# Patient Record
Sex: MALE | Race: WHITE | HISPANIC OR LATINO | Employment: FULL TIME | URBAN - METROPOLITAN AREA
[De-identification: names, ages, dates, MRNs, and addresses within clinical notes are randomized per-mention and may not be internally consistent; named-entity substitution may affect disease eponyms.]

---

## 2017-01-24 ENCOUNTER — TELEPHONE (OUTPATIENT)
Dept: OPTOMETRY | Facility: CLINIC | Age: 52
End: 2017-01-24

## 2017-01-25 ENCOUNTER — OFFICE VISIT (OUTPATIENT)
Dept: OPTOMETRY | Facility: CLINIC | Age: 52
End: 2017-01-25
Payer: COMMERCIAL

## 2017-01-25 DIAGNOSIS — H52.02 HYPERMETROPIA, LEFT EYE: ICD-10-CM

## 2017-01-25 DIAGNOSIS — H52.221 REGULAR ASTIGMATISM, RIGHT EYE: ICD-10-CM

## 2017-01-25 DIAGNOSIS — H04.123 BILATERAL DRY EYES: Primary | ICD-10-CM

## 2017-01-25 DIAGNOSIS — H52.31 ANISOMETROPIA: ICD-10-CM

## 2017-01-25 DIAGNOSIS — H11.001 PTERYGIUM, RIGHT: ICD-10-CM

## 2017-01-25 DIAGNOSIS — R51.9 FREQUENT HEADACHES: ICD-10-CM

## 2017-01-25 PROCEDURE — 92015 DETERMINE REFRACTIVE STATE: CPT | Mod: S$GLB,,, | Performed by: OPTOMETRIST

## 2017-01-25 PROCEDURE — 92004 COMPRE OPH EXAM NEW PT 1/>: CPT | Mod: S$GLB,,, | Performed by: OPTOMETRIST

## 2017-01-25 PROCEDURE — 99999 PR PBB SHADOW E&M-EST. PATIENT-LVL II: CPT | Mod: PBBFAC,,, | Performed by: OPTOMETRIST

## 2017-01-25 NOTE — PATIENT INSTRUCTIONS
Lágrimas Artificiales, Gotas Oftálmicas  ¿Qué es ryann medicamento?  Las LÁGRIMAS ARTIFICIALES alivian la irritación y la molestia ocasionadas por la sequedad de los ojos.  Ryann medicamento puede ser utilizado para otros usos; si tiene alguna pregunta consulte con ramos proveedor de atención médica o con ramos farmacéutico.  ¿Qué le tiffany informar a mi profesional de la jeanna antes de miguel ángel ryann medicamento?  Necesita saber si usted presenta alguno de los siguientes problemas o situaciones:  · cambios en la visión  · infección o traumatismo ocular  · si usa lentes de contacto  · solange reacción alérgica o inusual a las lágrimas artificiales, a otros medicamentos, alimentos, colorantes o conservantes  · si está embarazada o buscando quedar embarazada  · si está amamantando a un bebé  ¿Cómo tiffany utilizar ryann medicamento?  Ryann medicamento es para utilizar solamente en los ojos. No lo tome por vía oral. Siga las instrucciones de la etiqueta del medicamento. Lávese las silvio antes y después de usarlo. Incline la cherelle ligeramente hacia atrás y lleve el párpado inferior hacia abajo con ramos dedo índice para formar solange bolsa. Trate de que la punta del gotero no toque el dennis, las yemas de los dedos o cualquier otra superficie. Aplique la cantidad de gotas recetada (generalmente de solange a dos gotas) dentro de la bolsa. Cierre los ojos suavemente orlin unos instantes para permitir que las gotas entren en contacto con el dennis. Utilice jolly dosis a intervalos regulares. No utilice el medicamento con solange frecuencia mayor a la indicada.  Hable con ramos pediatra para informarse acerca del uso de [facility]. Aunque ryann medicamento ha sido recetado a niños tan menores bahman de 6 años de edad para condiciones selectivas, las precauciones se aplican.  Sobredosis: Póngase en contacto inmediatamente con un centro toxicológico o solange rubén de urgencia si usted marian que haya tomado demasiado medicamento.  ATENCIÓN: Ryann medicamento es  solo para usted. No comparta ryann medicamento con nadie.  ¿Qué sucede si me olvido de solange dosis?  Si olvida solange dosis, aplíquela lo antes posible. Si es reggie la hora de la próxima dosis, aplique sólo charlie dosis. No use dosis adicionales o dobles.  ¿Qué puede interactuar con ryann medicamento?  No se esperan interacciones. Si está utilizando otras gotas oculares junto con ryann medicamento, separe las aplicaciones de cada gota ocular por intervalos de aproximadamente 5 minutos. De esta manera se asegura de que ninguna de las gotas interfiera con la otra. Si está utilizando gotas oculares y solange pomada ocular, utilice las gotas 10 minutos antes de aplicarse la pomada a fin de que esta última no interfiera con la acción de las primeras.  Puede ser que esta lista no menciona todas las posibles interacciones. Informe a ramos profesional de la jeanna de todos los productos a base de hierbas, medicamentos de venta foreign o suplementos nutritivos que esté tomando. Si usted fuma, consume bebidas alcohólicas o si utiliza drogas ilegales, indíqueselo también a ramos profesional de la jeanna. Algunas sustancias pueden interactuar con ramos medicamento.  ¿A qué tiffany estar atento al usar ryann medicamento?  Si experimenta dolor ocular, cambios en la visión, enrojecimiento o irritación continua de los ojos o si ramos problema ocular empeora o persiste orlin más de 72 horas, suspenda el uso y comuníquese con ramos profesional de la jeanna.  Para evitar la contaminación del producto, evite que la punta del envase entre en contacto con cualquier superficie. No comparta ryann medicamento con nadie. Si ryann producto cambia de color, no lo utilice.  Si usa lentes de contacto, debe quitárselos antes de aplicar las gotas en jolly ojos. Espere por lo menos 15 minutos después de aplicar las gotas antes de volver a colocarse jolly lentes de contacto.  ¿Qué efectos secundarios puedo tener al utilizar ryann medicamento?  Efectos secundarios que debe informar a ramos médico  o a ramos profesional de la jeanna tan pronto bahman sea posible:  · reacciones alérgicas bahman erupción cutánea, picazón o urticarias, hinchazón de la yoav, labios o lengua  · cambios en la visión  · irritación o enrojecimiento de los ojos que empeora o que dura más de 72 horas  · dolor en los ojos  Efectos secundarios que, por lo general, no requieren atención médica (debe informarlos a ramos médico o a ramos profesional de la jeanna si persisten o si son molestos):  · visión borrosa o escozor temporal al aplicar las gotas oculares  Puede ser que esta lista no menciona todos los posibles efectos secundarios. Comuníquese a ramos médico por asesoramiento médico sobre los efectos secundarios. Usted puede informar los efectos secundarios a la FDA por teléfono al [phone].  ¿Dónde tiffany guardar mi medicina?  Manténgala fuera del alcance de los niños.  Guárdela a temperatura ambiente, entre 15 y 30 grados C (59 y 86 grados F). No la congele. Deseche todo el medicamento que no haya utilizado, después de la fecha de vencimiento. Solange vez abierto el producto, la mayoría de expertos recomiendan descartarlo despues de 30 días.  © 4070-2588 [employer]. [address] [place] [address], [place] [address]. Todos los derechos reservados. Esta información no pretende sustituir la atención médica profesional. Sólo ramos médico puede diagnosticar y tratar un problema de jeanna.        Tratamiento del dennis seco    Las lágrimas artificiales son el tratamiento más común para el dennis seco. Si no le alivian los síntomas, es posible que ramos oftalmólogo le coloque unos tapones o le walker solange operación para sellar el conducto de drenaje y aumentar la película de lágrimas.  Lágrimas artificiales  Las lágrimas artificiales, o colirios lubricantes, sustituyen las lágrimas lubricantes naturales. En ramos gran mayoría, pueden comprarse sin receta y usarse tan a menudo bahman sea necesario. Los colirios lubricantes no son los mismos que los que se usan para aliviar  el enrojecimiento o la picazón. Pregúntele al farmaceuta para asegurarse de comprar el colirio correcto.  Nota:  Algunos de los colirios lubricantes contienen conservantes que prolongan ramos duración. Si jolly ojos son sensibles a los colirios, o si necesita ponerse gotas con frecuencia, paloma vez le convenga comprar colirios lubricantes que no contienen conservantes. Además, ramos oftalmólogo podría recomendarle que se ponga solange pomada lubricante en los ojos antes de dormir.  Medicamentos  Ramos médico podría recetarle medicamentos tales bahman ciclosporina para tratar ramos afección en los ojos. Ésta puede ayudar a mejorar la capacidad de jolly ojos para producir lágrimas.  Tapones    El bloqueo de los puntos lagrimales con tapones puede ayudar a mantener la película de lágrimas en el dennis. Sofie bloqueo actúa bahman un tapón en un fregadero, permitiendo que se drene sólo solange pequeña cantidad de lágrimas del dennis. Paloma vez ramos oftalmólogo pruebe francisca con tapones provisionales que se disuelven en pocos días; si éstos sirven, podría ponerle tapones de larga duración. Para insertarle los tapones, le dormirán los ojos con un colirio a fin de que usted no sienta ningún dolor. Solange vez que tenga los tapones insertados, no debería sentir que los tiene puestos.  Cirugía  Si el dennis seco no se le ericka con lágrimas artificiales o tapones, ramos oftalmólogo podría hacerle solange cirugía ambulatoria mary para estrecharle o bloquearle las aperturas de los limon de drenaje. Si la causa del dennis seco es un problema de los párpados, ramos oftalmólogo le podría recomendar otros tipos de cirugía.  © 6126-1526 [email or web address]. [address] [place] [address], [place] [address]. Todos los derechos reservados. Esta información no pretende sustituir la atención médica profesional. Sólo ramos médico puede diagnosticar y tratar un problema de jeanna.

## 2017-01-25 NOTE — MR AVS SNAPSHOT
[facility]  [address] [facility] [place] [address]  Phone: [phone]                  Todd Grajedadila   2017 8:20 AM   Office Visit    Descripción:  Male : 1965   Personal Médico:  Tamanna Lyon, OD   Departamento:  [facility]           Razón de la josue     Eye Exam                Lista de tareas           Metas (5 Years of Data)     Ninguna      Follow-Up and Disposition     Return in about 1 year (around 2018).      [facility]     [facility] de Enfermeras - Asistencia   Enfermeras registradas de [facility] pueden ayudarle a reservar solange josue, proveer educación para la jeanna, asesoría clínica, y otros servicios de asesoramiento.   Llame para ryann servicio gratuito a [phone].             Medicamentos           Mensaje sobre Medicamentos     Verificar los cambios y / o adiciones a ramos régimen de medicación son los mismos que discutir con ramos médico. Si cualquiera de estos cambios o adiciones son incorrectos, por favor notifique a ramos proveedor de atención médica.             Verifique que la siguiente lista de medicamentos es solange representación exacta de los medicamentos que está tomando actualmente. Si no hay ningunos reportados, la lista puede estar en baker. Si no es correcta, por favor póngase en contacto con ramos proveedor de atención médica. Lleve esta lista con usted en jesús de emergencia.                Información de referencia clínica           Alergias     A partir del:  2017        No Known Allergies      Vacunas     Administradas en la fecha de la visita:  2017        None      Instrucciones    Lágrimas Artificiales, Gotas Oftálmicas  ¿Qué es ryann medicamento?  Las LÁGRIMAS ARTIFICIALES alivian la irritación y la molestia ocasionadas por la sequedad de los ojos.  Ryann medicamento puede ser utilizado para otros usos; si tiene alguna pregunta consulte con ramos proveedor de atención médica o con ramos  farmacéutico.  ¿Qué le tiffany informar a mi profesional de la jeanna antes de miguel ángel ryann medicamento?  Necesita saber si usted presenta alguno de los siguientes problemas o situaciones:  · cambios en la visión  · infección o traumatismo ocular  · si usa lentes de contacto  · solange reacción alérgica o inusual a las lágrimas artificiales, a otros medicamentos, alimentos, colorantes o conservantes  · si está embarazada o buscando quedar embarazada  · si está amamantando a un bebé  ¿Cómo tiffany utilizar ryann medicamento?  Ryann medicamento es para utilizar solamente en los ojos. No lo tome por vía oral. Siga las instrucciones de la etiqueta del medicamento. Lávese las silvio antes y después de usarlo. Incline la cherelle ligeramente hacia atrás y lleve el párpado inferior hacia abajo con ramos dedo índice para formar solange bolsa. Trate de que la punta del gotero no toque el dennis, las yemas de los dedos o cualquier otra superficie. Aplique la cantidad de gotas recetada (generalmente de solange a dos gotas) dentro de la bolsa. Cierre los ojos suavemente orlin unos instantes para permitir que las gotas entren en contacto con el dennis. Utilice jolly dosis a intervalos regulares. No utilice el medicamento con solange frecuencia mayor a la indicada.  Hable con ramos pediatra para informarse acerca del uso de ryann medicamento en niños. Aunque ryann medicamento ha sido recetado a niños tan menores bahman de 6 años de edad para condiciones selectivas, las precauciones se aplican.  Sobredosis: Póngase en contacto inmediatamente con un centro toxicológico o solange rubén de urgencia si usted marian que haya tomado demasiado medicamento.  ATENCIÓN: Ryann medicamento es solo para usted. No comparta ryann medicamento con nadie.  ¿Qué sucede si me olvido de solange dosis?  Si olvida solange dosis, aplíquela lo antes posible. Si es reggie la hora de la próxima dosis, aplique sólo charlie dosis. No use dosis adicionales o dobles.  ¿Qué puede interactuar con ryann medicamento?  No se esperan  interacciones. Si está utilizando otras gotas oculares junto con ryann medicamento, separe las aplicaciones de cada gota ocular por intervalos de aproximadamente 5 minutos. De esta manera se asegura de que ninguna de las gotas interfiera con la otra. Si está utilizando gotas oculares y solange pomada ocular, utilice las gotas 10 minutos antes de aplicarse la pomada a fin de que esta última no interfiera con la acción de las primeras.  Puede ser que esta lista no menciona todas las posibles interacciones. Informe a ramos profesional de la jeanna de todos los productos a base de hierbas, medicamentos de venta foreign o suplementos nutritivos que esté tomando. Si usted fuma, consume bebidas alcohólicas o si utiliza drogas ilegales, indíqueselo también a ramos profesional de la jeanna. Algunas sustancias pueden interactuar con ramos medicamento.  ¿A qué tiffany estar atento al usar ryann medicamento?  Si experimenta dolor ocular, cambios en la visión, enrojecimiento o irritación continua de los ojos o si ramos problema ocular empeora o persiste orlin más de 72 horas, suspenda el uso y comuníquese con ramos profesional de la jeanna.  Para evitar la contaminación del producto, evite que la punta del envase entre en contacto con cualquier superficie. No comparta ryann medicamento con nadie. Si ryann producto cambia de color, no lo utilice.  Si usa lentes de contacto, debe quitárselos antes de aplicar las gotas en jolly ojos. Espere por lo menos 15 minutos después de aplicar las gotas antes de volver a colocarse jolly lentes de contacto.  ¿Qué efectos secundarios puedo tener al utilizar ryann medicamento?  Efectos secundarios que debe informar a ramos médico o a ramos profesional de la jeanna tan pronto bahman sea posible:  · reacciones alérgicas bahman erupción cutánea, picazón o urticarias, hinchazón de la yoav, labios o lengua  · cambios en la visión  · irritación o enrojecimiento de los ojos que empeora o que dura más de 72 horas  · dolor en los ojos  Efectos  secundarios que, por lo general, no requieren atención médica (debe informarlos a ramos médico o a ramos profesional de la jeanna si persisten o si son molestos):  · visión borrosa o escozor temporal al aplicar las gotas oculares  Puede ser que esta lista no menciona todos los posibles efectos secundarios. Comuníquese a ramos médico por asesoramiento médico sobre los efectos secundarios. Usted puede informar los efectos secundarios a la FDA por teléfono al [phone].  ¿Dónde tiffany guardar mi medicina?  Manténgala fuera del alcance de los niños.  Guárdela a temperatura ambiente, entre 15 y 30 grados C (59 y 86 grados F). No la congele. Deseche todo el medicamento que no haya utilizado, después de la fecha de vencimiento. Solange vez abierto el producto, la mayoría de expertos recomiendan descartarlo despues de 30 días.  © 2716-4171 [employer]. [address] [place] [address]. Todos los derechos reservados. Esta información no pretende sustituir la atención médica profesional. Sólo ramos médico puede diagnosticar y tratar un problema de jeanna.        Tratamiento del dennis seco    Las lágrimas artificiales son el tratamiento más común para el dennis seco. Si no le alivian los síntomas, es posible que ramos oftalmólogo le coloque unos tapones o le walker solange operación para sellar el conducto de drenaje y aumentar la película de lágrimas.  Lágrimas artificiales  Las lágrimas artificiales, o colirios lubricantes, sustituyen las lágrimas lubricantes naturales. En ramos gran mayoría, pueden comprarse sin receta y usarse tan a menudo bahman sea necesario. Los colirios lubricantes no son los mismos que los que se usan para aliviar el enrojecimiento o la picazón. Pregúntele al farmaceuta para asegurarse de comprar el colirio correcto.  Nota:  Algunos de los colirios lubricantes contienen conservantes que prolongan ramos duración. Si jolly ojos son sensibles a los colirios, o si necesita ponerse gotas con frecuencia, paloma vez le  convenga comprar colirios lubricantes que no contienen conservantes. Además, kat oftalmólogo podría recomendarle que se ponga solange pomada lubricante en los ojos antes de dormir.  Medicamentos  Kat médico podría recetarle medicamentos tales bahman ciclosporina para tratar kat afección en los ojos. Ésta puede ayudar a mejorar la capacidad de jolly ojos para producir lágrimas.  Tapones    El bloqueo de los puntos lagrimales con tapones puede ayudar a mantener la película de lágrimas en el dennis. Sofie bloqueo actúa bahman un tapón en un fregadero, permitiendo que se drene sólo solange pequeña cantidad de lágrimas del dennis. Taz vez kat oftalmólogo pruebe francisca con tapones provisionales que se disuelven en pocos días; si éstos sirven, podría ponerle tapones de larga duración. Para insertarle los tapones, le dormirán los ojos con un colirio a fin de que usted no sienta ningún dolor. Solange vez que tenga los tapones insertados, no debería sentir que los tiene puestos.  Cirugía  Si el dennis seco no se le ericka con lágrimas artificiales o tapones, kat oftalmólogo podría hacerle solange cirugía ambulatoria mary para estrecharle o bloquearle las aperturas de los limon de drenaje. Si la causa del dennis seco es un problema de los párpados, kat oftalmólogo le podría recomendar otros tipos de cirugía.  © 0564-5589 [employer]. [address] [place] [address]. Todos los derechos reservados. Esta información no pretende sustituir la atención médica profesional. Sólo kat médico puede diagnosticar y tratar un problema de jeanna.                      Todd Sevilla   2017 8:20 AM   Office Visit    Description:  Male : 1965   Provider:  Tamanna Lyon OD   Department:  [facility]           Reason for Visit     Eye Exam                To Do List           Goals     None      Follow-Up and Disposition     Return in about 1 year (around 2018).      [facility]     [facility] -  24/7 Assistance  Registered nurses in the [facility] provide clinical advisement, health education, appointment booking, and other advisory services.  Call for this free service at [phone].             Medications           Message regarding Medications     Verify the changes and/or additions to your medication regime listed below are the same as discussed with your clinician today.  If any of these changes or additions are incorrect, please notify your healthcare provider.             Verify that the below list of medications is an accurate representation of the medications you are currently taking.  If none reported, the list may be blank. If incorrect, please contact your healthcare provider. Carry this list with you in case of emergency.                Clinical Reference Information           Allergies as of 1/25/2017     No Known Allergies      Immunizations Administered on Date of Encounter - 1/25/2017     None      Instructions    Lágrimas Artificiales, Gotas Oftálmicas  ¿Qué es ryann medicamento?  Las LÁGRIMAS ARTIFICIALES alivian la irritación y la molestia ocasionadas por la sequedad de los ojos.  Ryann medicamento puede ser utilizado para otros usos; si tiene alguna pregunta consulte con ramos proveedor de atención médica o con ramos farmacéutico.  ¿Qué le tiffany informar a mi profesional de la jeanna antes de miguel ángel ryann medicamento?  Necesita saber si usted presenta alguno de los siguientes problemas o situaciones:  · cambios en la visión  · infección o traumatismo ocular  · si usa lentes de contacto  · solange reacción alérgica o inusual a las lágrimas artificiales, a otros medicamentos, alimentos, colorantes o conservantes  · si está embarazada o buscando quedar embarazada  · si está amamantando a un bebé  ¿Cómo tiffany utilizar ryann medicamento?  Ryann medicamento es para utilizar solamente en los ojos. No lo tome por vía oral. Siga las instrucciones de la etiqueta del medicamento. Lávese las silvio  antes y después de usarlo. Incline la cherelle ligeramente hacia atrás y lleve el párpado inferior hacia abajo con ramos dedo índice para formar solange bolsa. Trate de que la punta del gotero no toque el dennis, las yemas de los dedos o cualquier otra superficie. Aplique la cantidad de gotas recetada (generalmente de solange a dos gotas) dentro de la bolsa. Cierre los ojos suavemente orlin unos instantes para permitir que las gotas entren en contacto con el dennis. Utilice jolly dosis a intervalos regulares. No utilice el medicamento con solange frecuencia mayor a la indicada.  Hable con ramos pediatra para informarse acerca del uso de ryann medicamento en niños. Aunque ryann medicamento ha sido recetado a niños tan menores bahman de 6 años de edad para condiciones selectivas, las precauciones se aplican.  Sobredosis: Póngase en contacto inmediatamente con un centro toxicológico o solange rubén de urgencia si usted marian que haya tomado demasiado medicamento.  ATENCIÓN: Ryann medicamento es solo para usted. No comparta ryann medicamento con nadie.  ¿Qué sucede si me olvido de solange dosis?  Si olvida solange dosis, aplíquela lo antes posible. Si es reggie la hora de la próxima dosis, aplique sólo charlie dosis. No use dosis adicionales o dobles.  ¿Qué puede interactuar con ryann medicamento?  No se esperan interacciones. Si está utilizando otras gotas oculares junto con ryann medicamento, separe las aplicaciones de cada gota ocular por intervalos de aproximadamente 5 minutos. De esta manera se asegura de que ninguna de las gotas interfiera con la otra. Si está utilizando gotas oculares y solange pomada ocular, utilice las gotas 10 minutos antes de aplicarse la pomada a fin de que esta última no interfiera con la acción de las primeras.  Puede ser que esta lista no menciona todas las posibles interacciones. Informe a ramos profesional de la jeanna de todos los productos a base de hierbas, medicamentos de venta foreign o suplementos nutritivos que esté tomando. Si usted fuma,  consume bebidas alcohólicas o si utiliza drogas ilegales, indíqueselo también a ramos profesional de la jeanna. Algunas sustancias pueden interactuar con ramos medicamento.  ¿A qué tiffany estar atento al usar ryann medicamento?  Si experimenta dolor ocular, cambios en la visión, enrojecimiento o irritación continua de los ojos o si ramos problema ocular empeora o persiste orlin más de 72 horas, suspenda el uso y comuníquese con ramos profesional de la jeanna.  Para evitar la contaminación del producto, evite que la punta del envase entre en contacto con cualquier superficie. No comparta ryann medicamento con nadie. Si ryann producto cambia de color, no lo utilice.  Si usa lentes de contacto, debe quitárselos antes de aplicar las gotas en jolly ojos. Espere por lo menos 15 minutos después de aplicar las gotas antes de volver a colocarse jolly lentes de contacto.  ¿Qué efectos secundarios puedo tener al utilizar ryann medicamento?  Efectos secundarios que debe informar a ramos médico o a ramos profesional de la jeanna tan pronto bahman sea posible:  · reacciones alérgicas bahman erupción cutánea, picazón o urticarias, hinchazón de la yoav, labios o lengua  · cambios en la visión  · irritación o enrojecimiento de los ojos que empeora o que dura más de 72 horas  · dolor en los ojos  Efectos secundarios que, por lo general, no requieren atención médica (debe informarlos a ramos médico o a ramos profesional de la jeanna si persisten o si son molestos):  · visión borrosa o escozor temporal al aplicar las gotas oculares  Puede ser que esta lista no menciona todos los posibles efectos secundarios. Comuníquese a ramos médico por asesoramiento médico sobre los efectos secundarios. Usted puede informar los efectos secundarios a la FDA por teléfono al [phone].  ¿Dónde tiffany guardar mi medicina?  Manténgala fuera del alcance de los niños.  Guárdela a temperatura ambiente, entre 15 y 30 grados C (59 y 86 grados F). No la congele. Deseche todo el medicamento que no  haya utilizado, después de la fecha de vencimiento. Solange vez abierto el producto, la mayoría de expertos recomiendan descartarlo despues de 30 días.  © 9044-6487 [employer]. [address] [place] [address]. Todos los derechos reservados. Esta información no pretende sustituir la atención médica profesional. Sólo ramos médico puede diagnosticar y tratar un problema de jeanna.        Tratamiento del dennis seco    Las lágrimas artificiales son el tratamiento más común para el dennis seco. Si no le alivian los síntomas, es posible que ramos oftalmólogo le coloque unos tapones o le walker solange operación para sellar el conducto de drenaje y aumentar la película de lágrimas.  Lágrimas artificiales  Las lágrimas artificiales, o colirios lubricantes, sustituyen las lágrimas lubricantes naturales. En ramos gran mayoría, pueden comprarse sin receta y usarse tan a menudo bahman sea necesario. Los colirios lubricantes no son los mismos que los que se usan para aliviar el enrojecimiento o la picazón. Pregúntele al farmaceuta para asegurarse de comprar el colirio correcto.  Nota:  Algunos de los colirios lubricantes contienen conservantes que prolongan ramos duración. Si jolly ojos son sensibles a los colirios, o si necesita ponerse gotas con frecuencia, paloma vez le convenga comprar colirios lubricantes que no contienen conservantes. Además, ramos oftalmólogo podría recomendarle que se ponga solange pomada lubricante en los ojos antes de dormir.  Medicamentos  Ramos médico podría recetarle medicamentos tales bahman ciclosporina para tratar ramos afección en los ojos. Ésta puede ayudar a mejorar la capacidad de jolly ojos para producir lágrimas.  Tapones    El bloqueo de los puntos lagrimales con tapones puede ayudar a mantener la película de lágrimas en el dennis. Sofie bloqueo actúa bahman un tapón en un fregadero, permitiendo que se drene sólo solange pequeña cantidad de lágrimas del dennis. Paloma vez ramos oftalmólogo pruebe francisca con tapones provisionales que  se disuelven en pocos días; si éstos sirven, podría ponerle tapones de larga duración. Para insertarle los tapones, le dormirán los ojos con un colirio a fin de que usted no sienta ningún dolor. Solange vez que tenga los tapones insertados, no debería sentir que los tiene puestos.  Cirugía  Si el dennis seco no se le ericka con lágrimas artificiales o tapones, ramos oftalmólogo podría hacerle solange cirugía ambulatoria mary para estrecharle o bloquearle las aperturas de los limon de drenaje. Si la causa del dennis seco es un problema de los párpados, ramos oftalmólogo le podría recomendar otros tipos de cirugía.  © 2594-8047 [employer]. [address] [place] [address], [place] [address]. Todos los derechos reservados. Esta información no pretende sustituir la atención médica profesional. Sólo ramos médico puede diagnosticar y tratar un problema de jeanna.

## 2018-03-06 NOTE — PROGRESS NOTES
HPI     Patient here today for comprehensive eye exam     He reports eyes burning throughout the day, eyes also feel tired at the   end of the day for the last 3 months.  He also has daily headaches for the past 3 months, usually in the mornings   (including upon waking), and sometimes in the evening. His PCP recommended   eye examination.  He reports satisfactory distance vision without glasses. Blurred near   vision without glasses, improved with +1.50 OTC readers.     (+)drops occasionally uses Visine may use a few times per month   (-)pain  (-)flashes  (+)floaters OU has had for years has not seen any recently   (-)diplopia    Diabetic no  LBS n/a  No results found for: HGBA1C    OCULAR HISTORY  Last Eye Exam 1st eye exam   (-)eye surgery   (-)eye injury   (-)diagnosed or treated for any eye conditions or diseases none    FAMILY HISTORY  (-)Glaucoma none   (-)Macular Degeneration none          Last edited by Tamanna Lyon, OD on 1/25/2017 10:29 AM.         Assessment /Plan     For exam results, see Encounter Report.    Bilateral dry eyes   Cause of burning and SPK OU. Recommended artificial tears BID-TID OU.    Frequent headaches   Possibly caused by uncorrected anisometropia, although pt sometimes awakes in the morning with headaches (suggests that visual etiology is unlikely). Recommended full-time glasses wear, if no improvement in headaches after 2 weeks then continue work-up with PCP.    Anisometropia  Regular astigmatism, right eye  Hypermetropia, left eye   New glasses prescription released, adaptation expected.   Eyeglass Final Rx     Eyeglass Final Rx      Sphere Cylinder Axis Add   Right [place] +1.00 180 +1.50   Left +1.25 Sphere  +1.50       Expiration Date:  1/26/2018    Comments:  Full-time wear             Pterygium, right   Asymptomatic. Recommended UV protection. Monitor.      RTC 1 yr                  Estimated Blood Loss (Cc): minimal

## 2020-02-26 ENCOUNTER — OFFICE VISIT (OUTPATIENT)
Dept: URGENT CARE | Facility: CLINIC | Age: 55
End: 2020-02-26
Payer: COMMERCIAL

## 2020-02-26 VITALS
HEART RATE: 90 BPM | SYSTOLIC BLOOD PRESSURE: 157 MMHG | TEMPERATURE: 98 F | RESPIRATION RATE: 18 BRPM | OXYGEN SATURATION: 98 % | DIASTOLIC BLOOD PRESSURE: 103 MMHG

## 2020-02-26 DIAGNOSIS — M79.671 RIGHT FOOT PAIN: Primary | ICD-10-CM

## 2020-02-26 PROCEDURE — 73630 X-RAY EXAM OF FOOT: CPT | Mod: RT,S$GLB,, | Performed by: RADIOLOGY

## 2020-02-26 PROCEDURE — 99203 PR OFFICE/OUTPT VISIT, NEW, LEVL III, 30-44 MIN: ICD-10-PCS | Mod: S$GLB,,, | Performed by: NURSE PRACTITIONER

## 2020-02-26 PROCEDURE — 73630 XR FOOT COMPLETE 3 VIEW RIGHT: ICD-10-PCS | Mod: RT,S$GLB,, | Performed by: RADIOLOGY

## 2020-02-26 PROCEDURE — 99203 OFFICE O/P NEW LOW 30 MIN: CPT | Mod: S$GLB,,, | Performed by: NURSE PRACTITIONER

## 2020-02-26 RX ORDER — NAPROXEN 500 MG/1
500 TABLET ORAL 2 TIMES DAILY WITH MEALS
Qty: 14 TABLET | Refills: 0 | Status: SHIPPED | OUTPATIENT
Start: 2020-02-26 | End: 2020-03-04

## 2020-02-26 NOTE — PROGRESS NOTES
Subjective:       Patient ID: Todd Sevilla is a 54 y.o. male.    Vitals:  temperature is 97.9 °F (36.6 °C). His blood pressure is 157/103 (abnormal) and his pulse is 90. His respiration is 18 and oxygen saturation is 98%.     Chief Complaint: Foot Injury    This is a 54 y.o. male who presents today with a chief complaint of right foot pain that began 2 days ago. The pain is worse in the morning. States he had tenderness, redness, and swelling in his great toe in that foot one month ago- took advil and it went away. No personal hx of gout but his dad had gout. No injury or trauma. He's been taking Advil to help relieve his symptoms.     Foot Injury    The incident occurred more than 1 week ago. There was no injury mechanism. The pain is present in the right foot. The pain is at a severity of 7/10. The pain is severe. The pain has been constant since onset. Associated symptoms include an inability to bear weight. He reports no foreign bodies present. The symptoms are aggravated by weight bearing. Treatments tried: advil. The treatment provided no relief.       Constitution: Negative for chills, fatigue and fever.   HENT: Negative for congestion and sore throat.    Neck: Negative for painful lymph nodes.   Cardiovascular: Negative for chest pain and leg swelling.   Eyes: Negative for double vision and blurred vision.   Respiratory: Negative for cough and shortness of breath.    Gastrointestinal: Negative for nausea, vomiting and diarrhea.   Genitourinary: Negative for dysuria, frequency and urgency.   Musculoskeletal: Positive for pain. Negative for joint pain, joint swelling, muscle cramps and muscle ache.   Skin: Negative for color change, pale and rash.   Allergic/Immunologic: Negative for seasonal allergies.   Neurological: Negative for dizziness, history of vertigo, light-headedness, passing out and headaches.   Hematologic/Lymphatic: Negative for swollen lymph nodes, easy bruising/bleeding and history of  blood clots. Does not bruise/bleed easily.   Psychiatric/Behavioral: Negative for nervous/anxious, sleep disturbance and depression. The patient is not nervous/anxious.        Objective:      Physical Exam   Constitutional: He is oriented to person, place, and time. Vital signs are normal. He appears well-developed and well-nourished. He is active and cooperative. No distress.   HENT:   Head: Normocephalic and atraumatic.   Nose: Nose normal.   Mouth/Throat: Oropharynx is clear and moist and mucous membranes are normal.   Eyes: Conjunctivae and lids are normal.   Neck: Trachea normal, normal range of motion, full passive range of motion without pain and phonation normal. Neck supple.   Cardiovascular: Normal rate, regular rhythm, normal heart sounds, intact distal pulses and normal pulses.   Pulmonary/Chest: Effort normal and breath sounds normal.   Abdominal: Soft. Normal appearance and bowel sounds are normal. He exhibits no abdominal bruit, no pulsatile midline mass and no mass.   Musculoskeletal: He exhibits no edema or deformity.        Right foot: There is tenderness. There is normal range of motion, no bony tenderness, no swelling, normal capillary refill, no crepitus, no deformity and no laceration.        Feet:    ROM normal. Mild TTP over medial aspect of foot. No redness, warmth, swelling.    Neurological: He is alert and oriented to person, place, and time. He has normal strength and normal reflexes. No sensory deficit.   Skin: Skin is warm, dry, intact and not diaphoretic. not right foot  Psychiatric: He has a normal mood and affect. His speech is normal and behavior is normal. Judgment and thought content normal. Cognition and memory are normal.   Nursing note and vitals reviewed.  X-ray Foot Complete 3 View Right    Result Date: 2/26/2020  EXAMINATION: XR FOOT COMPLETE 3 VIEW RIGHT CLINICAL HISTORY: . Pain in right foot TECHNIQUE: AP, lateral, and oblique views of the right foot were performed.  COMPARISON: None FINDINGS: Bones are well mineralized.  Alignment is satisfactory and joint spaces are fairly well maintained.  No fracture, dislocation, or osseous destruction.  Mild degenerative changes are noted.  No soft tissue swelling appreciated.     No acute abnormality Electronically signed by: Patrick Palmer MD Date:    02/26/2020 Time:    12:38        Assessment:       1. Right foot pain        Plan:         Right foot pain  -     X-Ray Foot Complete 3 view Right; Future; Expected date: 02/26/2020  -     naproxen (NAPROSYN) 500 MG tablet; Take 1 tablet (500 mg total) by mouth 2 (two) times daily with meals. for 7 days  Dispense: 14 tablet; Refill: 0  -     Ambulatory referral/consult to Internal Medicine         Reviewed previous pertinent office visits, PMH, PSH, fam hx  RICE therapy for now along with short course of naproxen  Pt needs PCP, referral placed  Advised on return/follow-up precautions. Advised on ER precautions. Answered all patient questions. Patient verbalized understanding and voiced agreement with current treatment plan.    Patient Instructions       R.I.C.E.  R.I.C.E. es el acrónimo de descansar, ponerse hielo, hacer presión y tener levantada la ezequiel lesionada, son medidas que le ayudan a tener menos dolor e hinchazón después de solange distensión, esguince, magulladura o golpe wendy. Si se ha lesionado, siga estas sugerencias para miguel ángel medidas lo antes posible.  [place]  El dolor es la forma que tiene el cuerpo de decir que el área lesionada debe descansar. Ya sea que se haya golpeado el codo, la mano, el pie o la rodilla, limite el uso de la parte lesionada para evitar un mayor daño y ayudarse a sanar.  Hielo  Colocarse hielo inmediatamente después de solange lesión ayuda a evitar la hinchazón y a calmar el dolor. No ponga el hielo directamente sobre la piel.  · Envuelva solange bolsa de hielo en solange nellie delgada y colóquela sobre el área lesionada.  · Póngase el hielo orlin 10 minutos cada  3 horas, avelino no más de 20 minutos cada vez.  Compresión  Hacer presión (compresión) sobre la lesión ayuda a evitar la hinchazón y sirve de soporte.  · Vende firmemente el área lesionada con solange venda elástica. Si siente cosquilleo en la mano o el pie, cambian de color o los siente fríos cuando los toca, puede ser que el vendaje esté muy ajustado. Almita un nuevo vendaje más flojo.   · Si el vendaje se afloja demasiado, vuelva a vendarse.  · No use solange venda elástica orlin toda la noche.  Elevación  La elevación es más eficaz cuando se mantiene la lesión elevada más alto que el nivel del corazón. Mantener solange lesión elevada ayuda a reducir la hinchazón, el dolor y la sensación palpitante.  Llame a ramos proveedor de atención médica si nota cualquiera de estos síntomas:  · Los dedos de la mano o del pie están adormecidos, pimentel cambiado de color o están fríos cuando los toca.  · La piel está brillante o estirada.  · Empeora el dolor, la hinchazón o el moretón, y no mejoran cuando la lesión se levanta.       Date Last Reviewed: 9/3/2015  © 7299-5843 [employer]. [address] [place] [id number]. Todos los derechos reservados. Esta información no pretende sustituir la atención médica profesional. Sólo ramos médico puede diagnosticar y tratar un problema de jeanna.

## 2020-02-26 NOTE — PATIENT INSTRUCTIONS
R.I.C.E.  R.I.C.E. es el acrónimo de descansar, ponerse hielo, hacer presión y tener levantada la ezequiel lesionada, son medidas que le ayudan a tener menos dolor e hinchazón después de solange distensión, esguince, magulladura o golpe wendy. Si se ha lesionado, siga estas sugerencias para miguel ángel medidas lo antes posible.  [place]  El dolor es la forma que tiene el cuerpo de decir que el área lesionada debe descansar. Ya sea que se haya golpeado el codo, la mano, el pie o la rodilla, limite el uso de la parte lesionada para evitar un mayor daño y ayudarse a sanar.  Hielo  Colocarse hielo inmediatamente después de solange lesión ayuda a evitar la hinchazón y a calmar el dolor. No ponga el hielo directamente sobre la piel.  · Envuelva solange bolsa de hielo en solange nellie delgada y colóquela sobre el área lesionada.  · Póngase el hielo orlin 10 minutos cada 3 horas, avelino no más de 20 minutos cada vez.  Compresión  Hacer presión (compresión) sobre la lesión ayuda a evitar la hinchazón y sirve de soporte.  · Vende firmemente el área lesionada con solange venda elástica. Si siente cosquilleo en la mano o el pie, cambian de color o los siente fríos cuando los toca, puede ser que el vendaje esté muy ajustado. Almita un nuevo vendaje más flojo.   · Si el vendaje se afloja demasiado, vuelva a vendarse.  · No use solange venda elástica orlin toda la noche.  Elevación  La elevación es más eficaz cuando se mantiene la lesión elevada más alto que el nivel del corazón. Mantener solange lesión elevada ayuda a reducir la hinchazón, el dolor y la sensación palpitante.  Llame a ramos proveedor de atención médica si nota cualquiera de estos síntomas:  · Los dedos de la mano o del pie están adormecidos, pimentel cambiado de color o están fríos cuando los toca.  · La piel está brillante o estirada.  · Empeora el dolor, la hinchazón o el moretón, y no mejoran cuando la lesión se levanta.       Date Last Reviewed: 9/3/2015  © 6601-0604 [employer] [address]  [facility], [place] [address]. Todos los derechos reservados. Esta información no pretende sustituir la atención médica profesional. Sólo ramos médico puede diagnosticar y tratar un problema de jeanna.

## 2020-03-04 ENCOUNTER — OFFICE VISIT (OUTPATIENT)
Dept: URGENT CARE | Facility: CLINIC | Age: 55
End: 2020-03-04
Payer: COMMERCIAL

## 2020-03-04 VITALS
HEART RATE: 97 BPM | TEMPERATURE: 101 F | HEIGHT: 67 IN | OXYGEN SATURATION: 95 % | DIASTOLIC BLOOD PRESSURE: 93 MMHG | BODY MASS INDEX: 29.82 KG/M2 | SYSTOLIC BLOOD PRESSURE: 142 MMHG | WEIGHT: 190 LBS | RESPIRATION RATE: 18 BRPM

## 2020-03-04 DIAGNOSIS — R50.9 FEVER, UNSPECIFIED FEVER CAUSE: ICD-10-CM

## 2020-03-04 DIAGNOSIS — J10.1 INFLUENZA A: Primary | ICD-10-CM

## 2020-03-04 LAB
CTP QC/QA: YES
FLUAV AG NPH QL: POSITIVE
FLUBV AG NPH QL: NEGATIVE

## 2020-03-04 PROCEDURE — 87804 INFLUENZA ASSAY W/OPTIC: CPT | Mod: QW,S$GLB,, | Performed by: NURSE PRACTITIONER

## 2020-03-04 PROCEDURE — 87804 POCT INFLUENZA A/B: ICD-10-PCS | Mod: 59,QW,S$GLB, | Performed by: NURSE PRACTITIONER

## 2020-03-04 PROCEDURE — 99214 OFFICE O/P EST MOD 30 MIN: CPT | Mod: 25,S$GLB,, | Performed by: NURSE PRACTITIONER

## 2020-03-04 PROCEDURE — 99214 PR OFFICE/OUTPT VISIT, EST, LEVL IV, 30-39 MIN: ICD-10-PCS | Mod: 25,S$GLB,, | Performed by: NURSE PRACTITIONER

## 2020-03-04 RX ORDER — OSELTAMIVIR PHOSPHATE 75 MG/1
75 CAPSULE ORAL 2 TIMES DAILY
Qty: 10 CAPSULE | Refills: 0 | Status: SHIPPED | OUTPATIENT
Start: 2020-03-04 | End: 2020-03-09

## 2020-03-04 NOTE — LETTER
March 4, 2020      [facility]  [address]  [facility] [address]  Phone: [phone]  Fax: [phone]       Patient: Todd Sevilla   YOB: 1965  Date of Visit: 03/04/2020    To Whom It May Concern:    Ariel Sevilla  was at [facility] on 03/04/2020. He may return to work on 03/09/20 or fever free without Tylenol or Ibuprofen with no restrictions. If you have any questions or concerns, or if I can be of further assistance, please do not hesitate to contact me.    Sincerely,        Opal Eid, RT

## 2020-03-05 NOTE — PROGRESS NOTES
"Subjective:       Patient ID: Todd Sevilla is a 54 y.o. male.    Vitals:  height is 5' 7" (1.702 m) and weight is 86.2 kg (190 lb). His tympanic temperature is 101.4 °F (38.6 °C) (abnormal). His blood pressure is 142/93 (abnormal) and his pulse is 97. His respiration is 18 and oxygen saturation is 95%.     Chief Complaint: URI    This is a 54 y.o. male who presents today with a chief complaint of persistent cough, headache and sore throat for 4 days. Taking Mucinex and Acetamminophen. Did not work Monday or Tuesday. Worked today. Fever began this afternoon. Vomited 3 times day was white mucous. Last Acetaminophen was noon today.     URI    This is a new problem. The current episode started in the past 7 days. The problem has been gradually worsening. Associated symptoms include congestion, coughing, headaches, nausea, a sore throat and vomiting. Pertinent negatives include no dysuria, ear pain, plugged ear sensation, rash, sinus pain or wheezing. He has tried acetaminophen for the symptoms. The treatment provided mild relief.       Constitution: Positive for activity change, fatigue, fever and generalized weakness. Negative for chills, sweating and international travel in last 60 days.   HENT: Positive for congestion and sore throat. Negative for ear pain, tinnitus, sinus pain, sinus pressure and voice change.    Neck: Negative for painful lymph nodes.   Cardiovascular: Positive for sob on exertion. Negative for chest trauma.   Eyes: Negative for eye redness.   Respiratory: Positive for cough. Negative for chest tightness, sputum production, bloody sputum, COPD, shortness of breath, stridor, wheezing and asthma.    Gastrointestinal: Positive for nausea and vomiting.   Genitourinary: Negative for dysuria.   Musculoskeletal: Negative for trauma and muscle ache.   Skin: Negative for rash.   Allergic/Immunologic: Negative for seasonal allergies, asthma, immunizations up-to-date and flu shot.   Neurological: " Positive for dizziness, light-headedness and headaches. Negative for altered mental status.   Hematologic/Lymphatic: Negative for swollen lymph nodes.   Psychiatric/Behavioral: Negative for altered mental status and confusion.       Objective:      Physical Exam   Constitutional: He is oriented to person, place, and time. He appears well-developed and well-nourished. He is cooperative.  Non-toxic appearance. He does not have a sickly appearance. He appears ill. No distress.   HENT:   Head: Normocephalic and atraumatic.   Right Ear: Hearing, tympanic membrane, external ear and ear canal normal.   Left Ear: Hearing, tympanic membrane, external ear and ear canal normal.   Nose: Mucosal edema and rhinorrhea present. No purulent discharge or nasal deformity. No epistaxis. Right sinus exhibits no maxillary sinus tenderness and no frontal sinus tenderness. Left sinus exhibits no maxillary sinus tenderness and no frontal sinus tenderness.   Mouth/Throat: Uvula is midline, oropharynx is clear and moist and mucous membranes are normal. No trismus in the jaw. Normal dentition. No uvula swelling. No oropharyngeal exudate, posterior oropharyngeal edema or posterior oropharyngeal erythema.   Eyes: Conjunctivae and lids are normal. No scleral icterus.   Neck: Trachea normal, full passive range of motion without pain and phonation normal. Neck supple. No neck rigidity. No edema and no erythema present.   Cardiovascular: Normal rate, regular rhythm, normal heart sounds, intact distal pulses and normal pulses.   Pulmonary/Chest: Effort normal and breath sounds normal. No respiratory distress. He has no decreased breath sounds. He has no wheezes. He has no rhonchi. He has no rales.   Abdominal: Normal appearance.   Musculoskeletal: Normal range of motion. He exhibits no edema or deformity.   Neurological: He is alert and oriented to person, place, and time. He exhibits normal muscle tone. Coordination normal.   Skin: Skin is warm,  dry, intact, not diaphoretic, not pale and no rash.   Psychiatric: He has a normal mood and affect. His speech is normal and behavior is normal. Judgment and thought content normal. Cognition and memory are normal.   Nursing note and vitals reviewed.        Results for orders placed or performed in visit on 03/04/20   POCT Influenza A/B   Result Value Ref Range    Rapid Influenza A Ag Positive (A) Negative    Rapid Influenza B Ag Negative Negative     Acceptable Yes        Assessment:       1. Influenza A    2. Fever, unspecified fever cause        Plan:         Influenza A  -     oseltamivir (TAMIFLU) 75 MG capsule; Take 1 capsule (75 mg total) by mouth 2 (two) times daily. With food for 5 days  Dispense: 10 capsule; Refill: 0    Fever, unspecified fever cause  -     POCT Influenza A/B         Risks and benefits of tamiflu discussed with patient  which includes benefit of decreased symptoms by about a day and potentially decreased severity of symptoms if taken within 24-48 hours of symptom onset.  Most common risks are nausea/vomiting and diarrhea-- nausea and vomiting may be decreased by eating crackers, toast, etc. Patient opts to take medication.

## 2020-03-05 NOTE — PATIENT INSTRUCTIONS
Return to Urgent Care or go to ER if symptoms worsen or fail to improve.  Follow up with PCP as recommended for further management.     THE FOLLOWING ARE RECOMMENDED TO HELP YOU MANAGE YOUR SYMPTOMS:    Take Ibuprofen or Acetaminophen according to label instructions for fever, throat pain, headache, and body aches    Use warm salt water gargles to ease your throat pain. Warm salt water gargles as needed for sore throat-  1/2 tsp salt to 1 cup warm water, gargle as desired.    Sometimes Nyquil at night is beneficial to help you get some rest, however it is sedating and does have an antihistamine, as well as tylenol.  The Nyquil behind the pharmacy counter also has the decongestant, pseudoephedrine as an additional ingredient.     Influenza (Adult)    Influenza is also called the flu. It is a viral illness that affects the air passages of your lungs. It is different from the common cold. The flu can easily be passed from one to person to another. It may be spread through the air by coughing and sneezing. Or it can be spread by touching the sick person and then touching your own eyes, nose, or mouth.  The flu starts 1 to 3 days after you are exposed to the flu virus. It may last for 1 to 2 weeks but many people feel tired or fatigued for many weeks afterward. You usually dont need to take antibiotics unless you have a complication. This might be an ear or sinus infection or pneumonia.  Symptoms of the flu may be mild or severe. They can include extreme tiredness (wanting to stay in bed all day), chills, fevers, muscle aches, soreness with eye movement, headache, and a dry, hacking cough.  Home care  Follow these guidelines when caring for yourself at home:  · Avoid being around cigarette smoke, whether yours or other peoples.  · Acetaminophen or ibuprofen will help ease your fever, muscle aches, and headache. Dont give aspirin to anyone younger than 18 who has the flu. Aspirin can harm the liver.  · Nausea and loss  of appetite are common with the flu. Eat light meals. Drink 6 to 8 glasses of liquids every day. Good choices are water, sport drinks, soft drinks without caffeine, juices, tea, and soup. Extra fluids will also help loosen secretions in your nose and lungs.  · Over-the-counter cold medicines will not make the flu go away faster. But the medicines may help with coughing, sore throat, and congestion in your nose and sinuses. Dont use a decongestant if you have high blood pressure.  · Stay home until your fever has been gone for at least 24 hours without using medicine to reduce fever.  Follow-up care  Follow up with your healthcare provider, or as advised, if you are not getting better over the next week.  If you are age 65 or older, talk with your provider about getting a pneumococcal vaccine every 5 years. You should also get this vaccine if you have chronic asthma or COPD. All adults should get a flu vaccine every fall. Ask your provider about this.  When to seek medical advice  Call your healthcare provider right away if any of these occur:  · Cough with lots of colored mucus (sputum) or blood in your mucus  · Chest pain, shortness of breath, wheezing, or trouble breathing  · Severe headache, or face, neck, or ear pain  · New rash with fever  · Fever of 100.4°F (38°C) or higher, or as directed by your healthcare provider  · Confusion, behavior change, or seizure  · Severe weakness or dizziness  · You get a new fever or cough after getting better for a few days  Date Last Reviewed: 1/1/2017  © 4725-2309 [employer]. [address] [place] [address], [place] [address]. All rights reserved. This information is not intended as a substitute for professional medical care. Always follow your healthcare professional's instructions.        Oseltamivir capsules  What is this medicine?  OSELTAMIVIR (os el RIOS i vir) is an antiviral medicine. It is used to prevent and to treat some kinds of influenza or the flu. It will  not work for colds or other viral infections.  How should I use this medicine?  Take this medicine by mouth with a glass of water. Follow the directions on the prescription label. Start this medicine at the first sign of flu symptoms. You can take it with or without food. If it upsets your stomach, take it with food. Take your medicine at regular intervals. Do not take your medicine more often than directed. Take all of your medicine as directed even if you think you are better. Do not skip doses or stop your medicine early.  Talk to your pediatrician regarding the use of this medicine in children. While this drug may be prescribed for children as young as 14 days for selected conditions, precautions do apply.  What side effects may I notice from receiving this medicine?  Side effects that you should report to your doctor or health care professional as soon as possible:  · allergic reactions like skin rash, itching or hives, swelling of the face, lips, or tongue  · anxiety, confusion, unusual behavior  · breathing problems  · hallucination, loss of contact with reality  · redness, blistering, peeling or loosening of the skin, including inside the mouth  · seizures  Side effects that usually do not require medical attention (report to your doctor or health care professional if they continue or are bothersome):  · diarrhea  · headache  · nausea, vomiting  · pain  What may interact with this medicine?  Interactions are not expected.  What if I miss a dose?  If you miss a dose, take it as soon as you remember. If it is almost time for your next dose (within 2 hours), take only that dose. Do not take double or extra doses.  Where should I keep my medicine?  Keep out of the reach of children.  Store at room temperature between 15 and 30 degrees C (59 and 86 degrees F). Throw away any unused medicine after the expiration date.  What should I tell my health care provider before I take this medicine?  They need to know if  you have any of the following conditions:  · heart disease  · immune system problems  · kidney disease  · liver disease  · lung disease  · an unusual or allergic reaction to oseltamivir, other medicines, foods, dyes, or preservatives  · pregnant or trying to get pregnant  · breast-feeding  What should I watch for while using this medicine?  Visit your doctor or health care professional for regular check ups. Tell your doctor if your symptoms do not start to get better or if they get worse.  If you have the flu, you may be at an increased risk of developing seizures, confusion, or abnormal behavior. This occurs early in the illness, and more frequently in children and teens. These events are not common, but may result in accidental injury to the patient. Families and caregivers of patients should watch for signs of unusual behavior and contact a doctor or health care professional right away if the patient shows signs of unusual behavior.  This medicine is not a substitute for the flu shot. Talk to your doctor each year about an annual flu shot.  NOTE:This sheet is a summary. It may not cover all possible information. If you have questions about this medicine, talk to your doctor, pharmacist, or health care provider. Copyright© 2017 [employer]

## 2020-03-12 ENCOUNTER — OFFICE VISIT (OUTPATIENT)
Dept: URGENT CARE | Facility: CLINIC | Age: 55
End: 2020-03-12
Payer: COMMERCIAL

## 2020-03-12 VITALS
RESPIRATION RATE: 20 BRPM | OXYGEN SATURATION: 97 % | SYSTOLIC BLOOD PRESSURE: 132 MMHG | TEMPERATURE: 99 F | HEIGHT: 67 IN | HEART RATE: 78 BPM | DIASTOLIC BLOOD PRESSURE: 86 MMHG | BODY MASS INDEX: 29.82 KG/M2 | WEIGHT: 190 LBS

## 2020-03-12 DIAGNOSIS — J10.1 INFLUENZA A: Primary | ICD-10-CM

## 2020-03-12 PROCEDURE — 99214 OFFICE O/P EST MOD 30 MIN: CPT | Mod: S$GLB,,, | Performed by: FAMILY MEDICINE

## 2020-03-12 PROCEDURE — 99214 PR OFFICE/OUTPT VISIT, EST, LEVL IV, 30-39 MIN: ICD-10-PCS | Mod: S$GLB,,, | Performed by: FAMILY MEDICINE

## 2020-03-12 RX ORDER — PROMETHAZINE HYDROCHLORIDE AND DEXTROMETHORPHAN HYDROBROMIDE 6.25; 15 MG/5ML; MG/5ML
5 SYRUP ORAL NIGHTLY PRN
Qty: 118 ML | Refills: 0 | Status: SHIPPED | OUTPATIENT
Start: 2020-03-12 | End: 2020-03-22

## 2020-03-12 RX ORDER — BENZONATATE 100 MG/1
100 CAPSULE ORAL EVERY 6 HOURS PRN
Qty: 30 CAPSULE | Refills: 1 | Status: SHIPPED | OUTPATIENT
Start: 2020-03-12 | End: 2021-03-12

## 2020-03-12 NOTE — PROGRESS NOTES
"Subjective:       Patient ID: Todd Sevilla is a 54 y.o. male.    Vitals:  height is 5' 7" (1.702 m) and weight is 86.2 kg (190 lb). His oral temperature is 98.7 °F (37.1 °C). His blood pressure is 132/86 and his pulse is 78. His respiration is 20 and oxygen saturation is 97%.     Chief Complaint: Cough    This is a 54 y.o. male   who presents today with a chief complaint of a cough that began a week ago. He's complaining of congestion, cough and a sore throat. He's been taking mucinex to help relieve his symptoms. He was diagnosed with flu a a week ago.     Cough   This is a new problem. The current episode started in the past 7 days. The problem has been gradually worsening. The problem occurs every few minutes. The cough is non-productive. Associated symptoms include a sore throat. Pertinent negatives include no chills, ear pain, eye redness, fever, hemoptysis, myalgias, rash, shortness of breath or wheezing. Treatments tried: mucinex. The treatment provided no relief.       Constitution: Negative for chills, sweating, fatigue and fever.   HENT: Positive for congestion and sore throat. Negative for ear pain, sinus pain, sinus pressure and voice change.    Neck: Negative for painful lymph nodes.   Eyes: Negative for eye redness.   Respiratory: Positive for cough. Negative for chest tightness, sputum production, bloody sputum, COPD, shortness of breath, stridor, wheezing and asthma.    Gastrointestinal: Negative for nausea and vomiting.   Musculoskeletal: Negative for muscle ache.   Skin: Negative for rash.   Allergic/Immunologic: Negative for seasonal allergies and asthma.   Hematologic/Lymphatic: Negative for swollen lymph nodes.       Objective:      Physical Exam   Constitutional: He appears well-developed and well-nourished.   HENT:   Head: Normocephalic and atraumatic.   Mouth/Throat: Posterior oropharyngeal erythema present.   Eyes: Pupils are equal, round, and reactive to light. EOM are normal.   Neck: " Normal range of motion.   Cardiovascular: Normal rate, regular rhythm and normal heart sounds.   Pulmonary/Chest: Effort normal. No respiratory distress. He has no wheezes. He has no rales.   Abdominal: Soft.   Nursing note and vitals reviewed.    Results for orders placed or performed in visit on 03/04/20   POCT Influenza A/B   Result Value Ref Range    Rapid Influenza A Ag Positive (A) Negative    Rapid Influenza B Ag Negative Negative     Acceptable Yes          Assessment:       1. Influenza A      discussed with patient normal sequelae of the flu and will treat cough symptomatically  Plan:         Influenza A  -     benzonatate (TESSALON PERLES) 100 MG capsule; Take 1 capsule (100 mg total) by mouth every 6 (six) hours as needed for Cough.  Dispense: 30 capsule; Refill: 1  -     promethazine-dextromethorphan (PROMETHAZINE-DM) 6.25-15 mg/5 mL Syrp; Take 5 mLs by mouth nightly as needed.  Dispense: 118 mL; Refill: 0

## 2020-03-12 NOTE — PATIENT INSTRUCTIONS

## 2021-03-10 ENCOUNTER — CLINICAL SUPPORT (OUTPATIENT)
Dept: URGENT CARE | Facility: CLINIC | Age: 56
End: 2021-03-10
Payer: MEDICAID

## 2021-03-10 DIAGNOSIS — U07.1 COVID-19: Primary | ICD-10-CM

## 2021-03-10 PROCEDURE — U0005 INFEC AGEN DETEC AMPLI PROBE: HCPCS | Performed by: PHYSICIAN ASSISTANT

## 2021-03-10 PROCEDURE — U0003 INFECTIOUS AGENT DETECTION BY NUCLEIC ACID (DNA OR RNA); SEVERE ACUTE RESPIRATORY SYNDROME CORONAVIRUS 2 (SARS-COV-2) (CORONAVIRUS DISEASE [COVID-19]), AMPLIFIED PROBE TECHNIQUE, MAKING USE OF HIGH THROUGHPUT TECHNOLOGIES AS DESCRIBED BY CMS-2020-01-R: HCPCS | Performed by: PHYSICIAN ASSISTANT

## 2021-03-11 LAB — SARS-COV-2 RNA RESP QL NAA+PROBE: NOT DETECTED

## 2021-11-17 ENCOUNTER — OFFICE VISIT (OUTPATIENT)
Dept: URGENT CARE | Facility: CLINIC | Age: 56
End: 2021-11-17
Payer: MEDICAID

## 2021-11-17 VITALS
WEIGHT: 190 LBS | HEART RATE: 94 BPM | HEIGHT: 67 IN | DIASTOLIC BLOOD PRESSURE: 94 MMHG | RESPIRATION RATE: 18 BRPM | BODY MASS INDEX: 29.82 KG/M2 | SYSTOLIC BLOOD PRESSURE: 162 MMHG | OXYGEN SATURATION: 97 % | TEMPERATURE: 97 F

## 2021-11-17 DIAGNOSIS — Z20.822 COVID-19 RULED OUT: ICD-10-CM

## 2021-11-17 DIAGNOSIS — M25.562 ACUTE PAIN OF LEFT KNEE: Primary | ICD-10-CM

## 2021-11-17 LAB
CTP QC/QA: YES
SARS-COV-2 RDRP RESP QL NAA+PROBE: NEGATIVE

## 2021-11-17 PROCEDURE — 73562 X-RAY EXAM OF KNEE 3: CPT | Mod: FY,LT,S$GLB, | Performed by: RADIOLOGY

## 2021-11-17 PROCEDURE — 73562 XR KNEE 3 VIEW LEFT: ICD-10-PCS | Mod: FY,LT,S$GLB, | Performed by: RADIOLOGY

## 2021-11-17 PROCEDURE — U0002: ICD-10-PCS | Mod: QW,S$GLB,, | Performed by: NURSE PRACTITIONER

## 2021-11-17 PROCEDURE — 99213 PR OFFICE/OUTPT VISIT, EST, LEVL III, 20-29 MIN: ICD-10-PCS | Mod: S$GLB,,, | Performed by: NURSE PRACTITIONER

## 2021-11-17 PROCEDURE — U0002 COVID-19 LAB TEST NON-CDC: HCPCS | Mod: QW,S$GLB,, | Performed by: NURSE PRACTITIONER

## 2021-11-17 PROCEDURE — 99213 OFFICE O/P EST LOW 20 MIN: CPT | Mod: S$GLB,,, | Performed by: NURSE PRACTITIONER

## 2021-11-17 RX ORDER — NAPROXEN 250 MG/1
250 TABLET ORAL 2 TIMES DAILY PRN
Qty: 15 TABLET | Refills: 0 | Status: SHIPPED | OUTPATIENT
Start: 2021-11-17 | End: 2022-04-24

## 2021-12-29 ENCOUNTER — CLINICAL SUPPORT (OUTPATIENT)
Dept: URGENT CARE | Facility: CLINIC | Age: 56
End: 2021-12-29
Payer: MEDICAID

## 2021-12-29 DIAGNOSIS — Z20.822 ENCOUNTER FOR LABORATORY TESTING FOR COVID-19 VIRUS: ICD-10-CM

## 2021-12-29 LAB
CTP QC/QA: YES
SARS-COV-2 RDRP RESP QL NAA+PROBE: NEGATIVE

## 2021-12-29 PROCEDURE — U0002: ICD-10-PCS | Mod: QW,S$GLB,, | Performed by: PHYSICIAN ASSISTANT

## 2021-12-29 PROCEDURE — U0002 COVID-19 LAB TEST NON-CDC: HCPCS | Mod: QW,S$GLB,, | Performed by: PHYSICIAN ASSISTANT

## 2021-12-29 NOTE — PROGRESS NOTES

## 2022-04-24 ENCOUNTER — OFFICE VISIT (OUTPATIENT)
Dept: URGENT CARE | Facility: CLINIC | Age: 57
End: 2022-04-24
Payer: MEDICAID

## 2022-04-24 VITALS
HEART RATE: 82 BPM | RESPIRATION RATE: 18 BRPM | TEMPERATURE: 98 F | DIASTOLIC BLOOD PRESSURE: 79 MMHG | WEIGHT: 190 LBS | BODY MASS INDEX: 29.82 KG/M2 | OXYGEN SATURATION: 96 % | HEIGHT: 67 IN | SYSTOLIC BLOOD PRESSURE: 140 MMHG

## 2022-04-24 DIAGNOSIS — M25.562 ACUTE PAIN OF LEFT KNEE: Primary | ICD-10-CM

## 2022-04-24 PROCEDURE — 4010F ACE/ARB THERAPY RXD/TAKEN: CPT | Mod: CPTII,S$GLB,, | Performed by: NURSE PRACTITIONER

## 2022-04-24 PROCEDURE — 3008F PR BODY MASS INDEX (BMI) DOCUMENTED: ICD-10-PCS | Mod: CPTII,S$GLB,, | Performed by: NURSE PRACTITIONER

## 2022-04-24 PROCEDURE — 1159F MED LIST DOCD IN RCRD: CPT | Mod: CPTII,S$GLB,, | Performed by: NURSE PRACTITIONER

## 2022-04-24 PROCEDURE — 3077F PR MOST RECENT SYSTOLIC BLOOD PRESSURE >= 140 MM HG: ICD-10-PCS | Mod: CPTII,S$GLB,, | Performed by: NURSE PRACTITIONER

## 2022-04-24 PROCEDURE — 99213 OFFICE O/P EST LOW 20 MIN: CPT | Mod: S$GLB,,, | Performed by: NURSE PRACTITIONER

## 2022-04-24 PROCEDURE — 99213 PR OFFICE/OUTPT VISIT, EST, LEVL III, 20-29 MIN: ICD-10-PCS | Mod: S$GLB,,, | Performed by: NURSE PRACTITIONER

## 2022-04-24 PROCEDURE — 3078F PR MOST RECENT DIASTOLIC BLOOD PRESSURE < 80 MM HG: ICD-10-PCS | Mod: CPTII,S$GLB,, | Performed by: NURSE PRACTITIONER

## 2022-04-24 PROCEDURE — 3008F BODY MASS INDEX DOCD: CPT | Mod: CPTII,S$GLB,, | Performed by: NURSE PRACTITIONER

## 2022-04-24 PROCEDURE — 4010F PR ACE/ARB THEARPY RXD/TAKEN: ICD-10-PCS | Mod: CPTII,S$GLB,, | Performed by: NURSE PRACTITIONER

## 2022-04-24 PROCEDURE — 1160F PR REVIEW ALL MEDS BY PRESCRIBER/CLIN PHARMACIST DOCUMENTED: ICD-10-PCS | Mod: CPTII,S$GLB,, | Performed by: NURSE PRACTITIONER

## 2022-04-24 PROCEDURE — 1160F RVW MEDS BY RX/DR IN RCRD: CPT | Mod: CPTII,S$GLB,, | Performed by: NURSE PRACTITIONER

## 2022-04-24 PROCEDURE — 3077F SYST BP >= 140 MM HG: CPT | Mod: CPTII,S$GLB,, | Performed by: NURSE PRACTITIONER

## 2022-04-24 PROCEDURE — 73562 X-RAY EXAM OF KNEE 3: CPT | Mod: FY,LT,S$GLB, | Performed by: RADIOLOGY

## 2022-04-24 PROCEDURE — 73562 XR KNEE 3 VIEW LEFT: ICD-10-PCS | Mod: FY,LT,S$GLB, | Performed by: RADIOLOGY

## 2022-04-24 PROCEDURE — 1159F PR MEDICATION LIST DOCUMENTED IN MEDICAL RECORD: ICD-10-PCS | Mod: CPTII,S$GLB,, | Performed by: NURSE PRACTITIONER

## 2022-04-24 PROCEDURE — 3078F DIAST BP <80 MM HG: CPT | Mod: CPTII,S$GLB,, | Performed by: NURSE PRACTITIONER

## 2022-04-24 RX ORDER — NAPROXEN 250 MG/1
250 TABLET ORAL 2 TIMES DAILY PRN
Qty: 15 TABLET | Refills: 0 | Status: SHIPPED | OUTPATIENT
Start: 2022-04-24

## 2022-04-24 RX ORDER — DICLOFENAC SODIUM 10 MG/G
2 GEL TOPICAL 2 TIMES DAILY PRN
Qty: 20 G | Refills: 0 | Status: SHIPPED | OUTPATIENT
Start: 2022-04-24

## 2022-04-24 NOTE — PATIENT INSTRUCTIONS
Rest - Rest the injured area,     Ice - Apply ice  to affected area for the first 24-48 hours (DO NOT APPLY ICE DIRECTLY TO THE SKIN.  DO NOT LEAVE ON AFFECTED BODY PART FOR MORE THAN 15 MINUTES AT AT TIME TO AVOID INJURY TO SOFT TISSUE)     Compression - Wear ACE wrap or splint provided for compression and comfort to help reduce pain and swelling    Elevate - Elevated affected area higher than your heart to reduce swelling    -  If you were prescribed an anti-inflammatory, please take as directed as needed for pain and inflammation. If you were not prescribed an anti-inflammatory, you can take Tylenol or ibuprofen over the counter as directed for pain unless you have an allergy to them or medical condition such as stomach ulcers, kidney or liver disease or blood thinners etc for which you should not be taking these type of medications.     Follow up with your PCP in 1 week or as needed if no improvement.      Repeat X ray in 1 week if you still have pain.    If your condition worsens or fails to improve we recommend that you receive another evaluation at the ER immediately or contact your PCP to discuss your concerns or return here.     You must understand that you've received an urgent care treatment only and that you may be released before all your medical problems are known or treated.     You the patient will arrange for followup care as instructed.

## 2022-04-24 NOTE — PROGRESS NOTES
"Subjective:       Patient ID: Todd Sevilla is a 56 y.o. male.    Vitals:  height is 5' 7" (1.702 m) and weight is 86.2 kg (190 lb). His temperature is 98.1 °F (36.7 °C). His blood pressure is 140/79 (abnormal) and his pulse is 82. His respiration is 18 and oxygen saturation is 96%.     Chief Complaint: Knee Pain    This is a 56 y.o. male who presents today with a chief complaint of   Left knee pain,Pt stated that he ran the [employer] 10 k mile last weekend and he has been having knee pains . Pt stated that at first it was his right knee and he put some ice on it and took some advil and it was fine . Pt stated that now the left knee is bothering him and he is doing the same treatment and no relief .  Denies any trauma fall or injury, denies numbness or tingling, denies radiating pain, denies fever, body aches or chills, denies cough, wheezing or shortness of breath, denies nausea, vomiting, diarrhea or abdominal pain, denies chest pain or dizziness positional lightheadedness, denies sore throat or trouble swallowing, denies loss of taste or smell, or any other symptoms      Knee Pain   The incident occurred more than 1 week ago. [place] at the park. The injury mechanism is unknown. The pain is present in the left knee. The quality of the pain is described as aching. The pain is at a severity of 8/10. The pain is moderate. The pain has been constant since onset. Associated symptoms include an inability to bear weight and muscle weakness. Pertinent negatives include no loss of motion, loss of sensation, numbness or tingling. He reports no foreign bodies present. The symptoms are aggravated by movement, palpation and weight bearing. He has tried NSAIDs for the symptoms. The treatment provided no relief.       Constitution: Negative for chills, sweating, fatigue and fever.   HENT: Negative for postnasal drip, sinus pain and sinus pressure.    Cardiovascular: Negative for chest pain. "   Respiratory: Negative for chest tightness, cough, sputum production, shortness of breath and wheezing.    Gastrointestinal: Negative for abdominal pain, nausea, vomiting, constipation and diarrhea.   Musculoskeletal: Positive for pain and joint pain.   Allergic/Immunologic: Negative for environmental allergies and seasonal allergies.   Neurological: Negative for dizziness, light-headedness and numbness.       Objective:      Physical Exam   Constitutional: He is oriented to person, place, and time. He appears well-developed. He is cooperative.  Non-toxic appearance. He does not appear ill. No distress.   HENT:   Head: Normocephalic and atraumatic. Head is without abrasion, without contusion and without laceration.   Ears:   Right Ear: Hearing, tympanic membrane, external ear and ear canal normal. No hemotympanum.   Left Ear: Hearing, tympanic membrane, external ear and ear canal normal. No hemotympanum.   Nose: Nose normal. No mucosal edema, rhinorrhea or nasal deformity. No epistaxis. Right sinus exhibits no maxillary sinus tenderness and no frontal sinus tenderness. Left sinus exhibits no maxillary sinus tenderness and no frontal sinus tenderness.   Mouth/Throat: Uvula is midline, oropharynx is clear and moist and mucous membranes are normal. No trismus in the jaw. Normal dentition. No uvula swelling. No posterior oropharyngeal erythema.   Eyes: Conjunctivae, EOM and lids are normal. Pupils are equal, round, and reactive to light. Right eye exhibits no discharge. Left eye exhibits no discharge. No scleral icterus.   Neck: Trachea normal and phonation normal. Neck supple. No tracheal deviation present. No neck rigidity present. No spinous process tenderness present. No muscular tenderness present.   Cardiovascular: Normal rate, regular rhythm, normal heart sounds and normal pulses.   Pulmonary/Chest: Effort normal and breath sounds normal. No respiratory distress.   Abdominal: Normal appearance and bowel sounds  are normal. He exhibits no distension, no pulsatile midline mass and no mass. Soft. There is no abdominal tenderness.   Musculoskeletal: Normal range of motion.         General: No deformity. Normal range of motion.      Right knee: Normal.      Left knee: Normal.      Comments: No clicking, popping or crepitus noted  ROM intact with pain   Neurological: He is alert and oriented to person, place, and time. He has normal strength. No cranial nerve deficit or sensory deficit. He exhibits normal muscle tone. He displays no seizure activity. Coordination normal. GCS eye subscore is 4. GCS verbal subscore is 5. GCS motor subscore is 6.   Skin: Skin is warm, dry, intact, not diaphoretic and not pale. Capillary refill takes less than 2 seconds. No abrasion, No burn, No bruising and No ecchymosis   Psychiatric: His speech is normal and behavior is normal. Judgment and thought content normal.   Nursing note and vitals reviewed.      X-Ray Knee 3 View Left    Result Date: 4/24/2022  EXAMINATION: XR KNEE 3 VIEW LEFT CLINICAL HISTORY: Pain in left knee TECHNIQUE: AP, lateral, and Merchant views of the left knee were performed. COMPARISON: November 17, 2021 FINDINGS: No displaced fracture or subluxation.  No suspicious bone lesion. Small volume left suprapatellar synovial effusion, increased since the prior exam. Mild-moderate femoral tibial and patellofemoral DJD.     Small volume left suprapatellar synovial effusion. Electronically signed by: Kirt Sims MD Date:    04/24/2022 Time:    10:39      Patient in no acute distress.  Vitals reassuring.  X-ray results discussed with patient in detail.  Medication prescribed and over-the-counter medication discussed with patient in length.  Referral to Orthopedics for further evaluation.  Discussed results/diagnosis/plan in depth with patient in clinic. Strict precautions given to patient to monitor for worsening signs and symptoms. Advised to follow up with primary.All questions  answered. Strict ER precautions given. If your symptoms worsens or fail to improve you should go to the Emergency Room. Discharge and follow-up instructions given verbally/printed. Discharge and follow-up instructions discussed with the patient who expressed understanding and willingness to comply with my recommendations.Patient voiced understanding and in agreement with current treatment plan.     Please be advised this text was dictated with [employer] software and may contain errors due to translation.      Assessment:       1. Acute pain of left knee          Plan:         Acute pain of left knee  -     X-Ray Knee 3 View Left; Future; Expected date: 04/24/2022  -     diclofenac sodium (VOLTAREN) 1 % Gel; Apply 2 g topically 2 (two) times daily as needed (for pain).  Dispense: 20 g; Refill: 0  -     naproxen (NAPROSYN) 250 MG tablet; Take 1 tablet (250 mg total) by mouth 2 (two) times daily as needed (for pain, take with meals).  Dispense: 15 tablet; Refill: 0  -     Ambulatory referral/consult to Orthopedics                 Patient Instructions     Rest - Rest the injured area,     Ice - Apply ice  to affected area for the first 24-48 hours (DO NOT APPLY ICE DIRECTLY TO THE SKIN.  DO NOT LEAVE ON AFFECTED BODY PART FOR MORE THAN 15 MINUTES AT AT TIME TO AVOID INJURY TO SOFT TISSUE)     Compression - Wear ACE wrap or splint provided for compression and comfort to help reduce pain and swelling    Elevate - Elevated affected area higher than your heart to reduce swelling    -  If you were prescribed an anti-inflammatory, please take as directed as needed for pain and inflammation. If you were not prescribed an anti-inflammatory, you can take Tylenol or ibuprofen over the counter as directed for pain unless you have an allergy to them or medical condition such as stomach ulcers, kidney or liver disease or blood thinners etc for which you should not be taking these type of medications.     Follow up with your PCP in 1  week or as needed if no improvement.      Repeat X ray in 1 week if you still have pain.    If your condition worsens or fails to improve we recommend that you receive another evaluation at the ER immediately or contact your PCP to discuss your concerns or return here.     You must understand that you've received an urgent care treatment only and that you may be released before all your medical problems are known or treated.     You the patient will arrange for followup care as instructed.

## 2022-06-12 ENCOUNTER — OFFICE VISIT (OUTPATIENT)
Dept: URGENT CARE | Facility: CLINIC | Age: 57
End: 2022-06-12
Payer: MEDICAID

## 2022-06-12 VITALS
RESPIRATION RATE: 18 BRPM | DIASTOLIC BLOOD PRESSURE: 92 MMHG | BODY MASS INDEX: 29.82 KG/M2 | OXYGEN SATURATION: 98 % | HEIGHT: 67 IN | SYSTOLIC BLOOD PRESSURE: 135 MMHG | WEIGHT: 190 LBS | HEART RATE: 82 BPM

## 2022-06-12 DIAGNOSIS — Z76.0 MEDICATION REFILL: ICD-10-CM

## 2022-06-12 DIAGNOSIS — R52 PAIN: ICD-10-CM

## 2022-06-12 DIAGNOSIS — M10.9 ACUTE GOUT, UNSPECIFIED CAUSE, UNSPECIFIED SITE: Primary | ICD-10-CM

## 2022-06-12 PROCEDURE — 3075F PR MOST RECENT SYSTOLIC BLOOD PRESS GE 130-139MM HG: ICD-10-PCS | Mod: CPTII,S$GLB,,

## 2022-06-12 PROCEDURE — 99213 PR OFFICE/OUTPT VISIT, EST, LEVL III, 20-29 MIN: ICD-10-PCS | Mod: S$GLB,,,

## 2022-06-12 PROCEDURE — 99213 OFFICE O/P EST LOW 20 MIN: CPT | Mod: S$GLB,,,

## 2022-06-12 PROCEDURE — 73130 X-RAY EXAM OF HAND: CPT | Mod: FY,RT,S$GLB, | Performed by: RADIOLOGY

## 2022-06-12 PROCEDURE — 3080F PR MOST RECENT DIASTOLIC BLOOD PRESSURE >= 90 MM HG: ICD-10-PCS | Mod: CPTII,S$GLB,,

## 2022-06-12 PROCEDURE — 1159F PR MEDICATION LIST DOCUMENTED IN MEDICAL RECORD: ICD-10-PCS | Mod: CPTII,S$GLB,,

## 2022-06-12 PROCEDURE — 3075F SYST BP GE 130 - 139MM HG: CPT | Mod: CPTII,S$GLB,,

## 2022-06-12 PROCEDURE — 1160F RVW MEDS BY RX/DR IN RCRD: CPT | Mod: CPTII,S$GLB,,

## 2022-06-12 PROCEDURE — 3080F DIAST BP >= 90 MM HG: CPT | Mod: CPTII,S$GLB,,

## 2022-06-12 PROCEDURE — 1160F PR REVIEW ALL MEDS BY PRESCRIBER/CLIN PHARMACIST DOCUMENTED: ICD-10-PCS | Mod: CPTII,S$GLB,,

## 2022-06-12 PROCEDURE — 3008F BODY MASS INDEX DOCD: CPT | Mod: CPTII,S$GLB,,

## 2022-06-12 PROCEDURE — 4010F ACE/ARB THERAPY RXD/TAKEN: CPT | Mod: CPTII,S$GLB,,

## 2022-06-12 PROCEDURE — 73130 XR HAND COMPLETE 3 VIEW RIGHT: ICD-10-PCS | Mod: FY,RT,S$GLB, | Performed by: RADIOLOGY

## 2022-06-12 PROCEDURE — 4010F PR ACE/ARB THEARPY RXD/TAKEN: ICD-10-PCS | Mod: CPTII,S$GLB,,

## 2022-06-12 PROCEDURE — 1159F MED LIST DOCD IN RCRD: CPT | Mod: CPTII,S$GLB,,

## 2022-06-12 PROCEDURE — 3008F PR BODY MASS INDEX (BMI) DOCUMENTED: ICD-10-PCS | Mod: CPTII,S$GLB,,

## 2022-06-12 RX ORDER — ROSUVASTATIN CALCIUM 20 MG/1
20 TABLET, COATED ORAL DAILY
Qty: 30 TABLET | Refills: 0 | Status: SHIPPED | OUTPATIENT
Start: 2022-06-12 | End: 2022-07-12

## 2022-06-12 RX ORDER — KETOROLAC TROMETHAMINE 30 MG/ML
30 INJECTION, SOLUTION INTRAMUSCULAR; INTRAVENOUS
Status: COMPLETED | OUTPATIENT
Start: 2022-06-12 | End: 2022-06-12

## 2022-06-12 RX ORDER — NAPROXEN 500 MG/1
500 TABLET ORAL 2 TIMES DAILY
Qty: 20 TABLET | Refills: 0 | Status: SHIPPED | OUTPATIENT
Start: 2022-06-12 | End: 2022-06-22

## 2022-06-12 RX ORDER — ROSUVASTATIN CALCIUM 20 MG/1
20 TABLET, COATED ORAL DAILY
COMMUNITY
End: 2022-06-12 | Stop reason: SDUPTHER

## 2022-06-12 RX ORDER — LISINOPRIL 5 MG/1
5 TABLET ORAL DAILY
Qty: 30 TABLET | Refills: 0 | Status: SHIPPED | OUTPATIENT
Start: 2022-06-12 | End: 2022-07-12

## 2022-06-12 RX ORDER — COLCHICINE 0.6 MG/1
0.6 TABLET ORAL DAILY
Qty: 5 TABLET | Refills: 0 | Status: SHIPPED | OUTPATIENT
Start: 2022-06-12 | End: 2022-06-17

## 2022-06-12 RX ORDER — LISINOPRIL 5 MG/1
5 TABLET ORAL
COMMUNITY
End: 2022-06-12 | Stop reason: SDUPTHER

## 2022-06-12 RX ADMIN — KETOROLAC TROMETHAMINE 30 MG: 30 INJECTION, SOLUTION INTRAMUSCULAR; INTRAVENOUS at 10:06

## 2022-06-12 NOTE — PROGRESS NOTES
"Subjective:       Patient ID: Todd Sevilla is a 56 y.o. male.    Vitals:  height is 5' 7" (1.702 m) and weight is 86.2 kg (190 lb). His blood pressure is 135/92 (abnormal) and his pulse is 82. His respiration is 18 and oxygen saturation is 98%.     Chief Complaint: Hand Pain (Left hand pain /Finger swollen)    56-year-old male with past medical history of hypertension and hyperlipidemia complains of right DIP middle finger joint pain, swelling, redness and warmth for 2 days.  The language line  was used for communication today.  Patient denies any trauma to the joint.  He states that about 3 days ago started out of nowhere.  He does eat a lot of red meat.  Patient states that he has a history of gout.  He does feel exits go flare.     Patient also requesting refill of his medications.  He states any takes lisinopril daily and rosuvastatin daily for hyperlipidemia and high blood pressure.  He has no complaints or concerns including no chest pain or shortness of breath, dizziness, numbness, tingling, visual disturbances or headaches.    Hand Pain   The incident occurred 2 days ago. The pain is present in the right fingers and right hand. He has tried nothing for the symptoms. The treatment provided no relief.       Constitution: Negative for chills, sweating, fatigue and fever.   Musculoskeletal: Positive for joint pain, joint swelling, abnormal ROM of joint and gout.   Neurological: Negative for headaches.       Objective:      Physical Exam   Constitutional: He is oriented to person, place, and time. He appears well-developed. He is cooperative.  Non-toxic appearance. He does not appear ill. No distress.   HENT:   Head: Normocephalic and atraumatic.   Ears:   Right Ear: Hearing, tympanic membrane, external ear and ear canal normal.   Left Ear: Hearing, tympanic membrane, external ear and ear canal normal.   Nose: Nose normal. No mucosal edema, rhinorrhea or nasal deformity. No epistaxis. Right " sinus exhibits no maxillary sinus tenderness and no frontal sinus tenderness. Left sinus exhibits no maxillary sinus tenderness and no frontal sinus tenderness.   Mouth/Throat: Uvula is midline, oropharynx is clear and moist and mucous membranes are normal. No trismus in the jaw. Normal dentition. No uvula swelling. No posterior oropharyngeal erythema.   Eyes: Conjunctivae and lids are normal. Right eye exhibits no discharge. Left eye exhibits no discharge. No scleral icterus.   Neck: Trachea normal and phonation normal. Neck supple.   Cardiovascular: Normal rate, regular rhythm, normal heart sounds and normal pulses.   Pulmonary/Chest: Effort normal and breath sounds normal. No respiratory distress.   Abdominal: Normal appearance and bowel sounds are normal. He exhibits no distension and no mass. Soft. There is no abdominal tenderness.   Musculoskeletal:         General: No deformity.      Right hand: Right middle finger: Exhibits decreased ROM, swelling and tenderness (D IP joint swelling, tenderness, warmth and redness.  Tender palpation.  Decreased range of motion due to pain.).   Neurological: He is alert and oriented to person, place, and time. He exhibits normal muscle tone. Coordination normal.   Skin: Skin is warm, dry, intact, not diaphoretic and not pale.   Psychiatric: His speech is normal and behavior is normal. Judgment and thought content normal.   Nursing note and vitals reviewed.      X-Ray Hand 3 view Right    Result Date: 6/12/2022  EXAMINATION: XR HAND COMPLETE 3 VIEW RIGHT CLINICAL HISTORY: Pain, unspecified TECHNIQUE: PA, lateral, and oblique views of the right hand were performed. COMPARISON: None FINDINGS: Radiographically, there is no significant soft tissue swelling involving the distal 3rd finger.  The osseous structures, soft tissues and joint spaces are normal.  No radiopaque foreign body.     This exam is within normal limits. Electronically signed by: Sho Cox MD  Date:    06/12/2022 Time:    10:45    Assessment:       1. Acute gout, unspecified cause, unspecified site    2. Pain    3. Medication refill          Plan:       Language line  used: [id number], #[id number]  Discussed imaging results with patient.  No signs of fracture or dislocation of the right middle finger.  Findings consistent with gout.  No purulent drainage suggesting cellulitis.  No paronychia.  Will treat with Toradol, naproxen and colchicine.  Patient advised to start taking naproxen tomorrow.    Pt educated on common NSAIDS, and instructed not to mix them, and to follow instructions on the label for the formulations available over the counter. Pt informed that common NSAIDs include: regular aspirin, Aleve, Advil, Excedrin, ibuprofen, Motrin, Goody's or, BC powders, meloxicam, naproxen, diclofenac, Celebrex.    Refilled patient's medication per request.  Advised follow-up with PCP for any refills.  Acute gout, unspecified cause, unspecified site  -     ketorolac injection 30 mg  -     naproxen (NAPROSYN) 500 MG tablet; Take 1 tablet (500 mg total) by mouth 2 (two) times daily. for 10 days  Dispense: 20 tablet; Refill: 0  -     colchicine (COLCRYS) 0.6 mg tablet; Take 1 tablet (0.6 mg total) by mouth once daily. for 5 days  Dispense: 5 tablet; Refill: 0    Pain  -     X-Ray Hand 3 view Right; Future; Expected date: 06/12/2022    Medication refill  -     lisinopriL (PRINIVIL,ZESTRIL) 5 MG tablet; Take 1 tablet (5 mg total) by mouth once daily.  Dispense: 30 tablet; Refill: 0  -     rosuvastatin (CRESTOR) 20 MG tablet; Take 1 tablet (20 mg total) by mouth once daily.  Dispense: 30 tablet; Refill: 0                      DISPLAY PLAN FREE TEXT

## 2022-06-12 NOTE — PATIENT INSTRUCTIONS
You were given Toradol in clinic today. Please do not take any NSAIDs including ibuprofen, naproxen, aleve, advil, motrin, clexa, mobic, or others for the next 24 hrs.   If you have abdominal pain, vomiting, or bloody stool please go to the ER immediately.

## 2022-11-15 ENCOUNTER — OFFICE VISIT (OUTPATIENT)
Dept: URGENT CARE | Facility: CLINIC | Age: 57
End: 2022-11-15
Payer: MEDICAID

## 2022-11-15 VITALS
SYSTOLIC BLOOD PRESSURE: 135 MMHG | TEMPERATURE: 99 F | HEART RATE: 74 BPM | OXYGEN SATURATION: 97 % | RESPIRATION RATE: 18 BRPM | HEIGHT: 67 IN | BODY MASS INDEX: 29.82 KG/M2 | DIASTOLIC BLOOD PRESSURE: 80 MMHG | WEIGHT: 190 LBS

## 2022-11-15 DIAGNOSIS — M76.60 ACHILLES TENDINITIS, UNSPECIFIED LATERALITY: Primary | ICD-10-CM

## 2022-11-15 PROCEDURE — 3075F SYST BP GE 130 - 139MM HG: CPT | Mod: CPTII,S$GLB,, | Performed by: FAMILY MEDICINE

## 2022-11-15 PROCEDURE — 3079F DIAST BP 80-89 MM HG: CPT | Mod: CPTII,S$GLB,, | Performed by: FAMILY MEDICINE

## 2022-11-15 PROCEDURE — 1160F RVW MEDS BY RX/DR IN RCRD: CPT | Mod: CPTII,S$GLB,, | Performed by: FAMILY MEDICINE

## 2022-11-15 PROCEDURE — 3008F BODY MASS INDEX DOCD: CPT | Mod: CPTII,S$GLB,, | Performed by: FAMILY MEDICINE

## 2022-11-15 PROCEDURE — 1159F PR MEDICATION LIST DOCUMENTED IN MEDICAL RECORD: ICD-10-PCS | Mod: CPTII,S$GLB,, | Performed by: FAMILY MEDICINE

## 2022-11-15 PROCEDURE — 4010F PR ACE/ARB THEARPY RXD/TAKEN: ICD-10-PCS | Mod: CPTII,S$GLB,, | Performed by: FAMILY MEDICINE

## 2022-11-15 PROCEDURE — 3075F PR MOST RECENT SYSTOLIC BLOOD PRESS GE 130-139MM HG: ICD-10-PCS | Mod: CPTII,S$GLB,, | Performed by: FAMILY MEDICINE

## 2022-11-15 PROCEDURE — 3008F PR BODY MASS INDEX (BMI) DOCUMENTED: ICD-10-PCS | Mod: CPTII,S$GLB,, | Performed by: FAMILY MEDICINE

## 2022-11-15 PROCEDURE — 99214 PR OFFICE/OUTPT VISIT, EST, LEVL IV, 30-39 MIN: ICD-10-PCS | Mod: S$GLB,,, | Performed by: FAMILY MEDICINE

## 2022-11-15 PROCEDURE — 3079F PR MOST RECENT DIASTOLIC BLOOD PRESSURE 80-89 MM HG: ICD-10-PCS | Mod: CPTII,S$GLB,, | Performed by: FAMILY MEDICINE

## 2022-11-15 PROCEDURE — 99214 OFFICE O/P EST MOD 30 MIN: CPT | Mod: S$GLB,,, | Performed by: FAMILY MEDICINE

## 2022-11-15 PROCEDURE — 1159F MED LIST DOCD IN RCRD: CPT | Mod: CPTII,S$GLB,, | Performed by: FAMILY MEDICINE

## 2022-11-15 PROCEDURE — 1160F PR REVIEW ALL MEDS BY PRESCRIBER/CLIN PHARMACIST DOCUMENTED: ICD-10-PCS | Mod: CPTII,S$GLB,, | Performed by: FAMILY MEDICINE

## 2022-11-15 PROCEDURE — 4010F ACE/ARB THERAPY RXD/TAKEN: CPT | Mod: CPTII,S$GLB,, | Performed by: FAMILY MEDICINE

## 2022-11-15 RX ORDER — INDOMETHACIN 50 MG/1
50 CAPSULE ORAL 3 TIMES DAILY
Qty: 30 CAPSULE | Refills: 1 | Status: SHIPPED | OUTPATIENT
Start: 2022-11-15

## 2022-11-15 RX ORDER — KETOROLAC TROMETHAMINE 30 MG/ML
30 INJECTION, SOLUTION INTRAMUSCULAR; INTRAVENOUS
Status: COMPLETED | OUTPATIENT
Start: 2022-11-15 | End: 2022-11-15

## 2022-11-15 RX ADMIN — KETOROLAC TROMETHAMINE 30 MG: 30 INJECTION, SOLUTION INTRAMUSCULAR; INTRAVENOUS at 08:11

## 2022-11-15 NOTE — LETTER
November 15, 2022      [facility]  [address] [address]  Phone: [phone]  Fax: [phone]       Patient: Todd Sevilla   YOB: 1965  Date of Visit: 11/15/2022    To Whom It May Concern:    Ariel Sevilla  was at [facility] on 11/15/2022. The patient may return to work/school on 11/16/22   with no restrictions. If you have any questions or concerns, or if I can be of further assistance, please do not hesitate to contact me.    Sincerely,    Monse Lamb MD

## 2022-11-15 NOTE — PROGRESS NOTES
"Subjective:       Patient ID: Todd Sevilla is a 56 y.o. male.    Vitals:  height is 5' 7" (1.702 m) and weight is 86.2 kg (190 lb). His temperature is 98.8 °F (37.1 °C). His blood pressure is 135/80 and his pulse is 74. His respiration is 18 and oxygen saturation is 97%.     Chief Complaint: foot pain    Patient presents to clinic with right heel  pain, X 1 Week pain, home treatments include advil. No injury or trauma  No relief with otc    Foot Injury   The incident occurred 5 to 7 days ago. There was no injury mechanism. The pain is present in the right heel and right foot. The pain is at a severity of 5/10. The pain is mild. The pain has been Constant since onset. The symptoms are aggravated by weight bearing. The treatment provided no relief.   ROS    Objective:      Physical Exam   Constitutional: He is oriented to person, place, and time. He appears well-developed. He is cooperative.  Non-toxic appearance. He does not appear ill. No distress.   HENT:   Head: Normocephalic and atraumatic.   Ears:   Right Ear: Hearing, tympanic membrane, external ear and ear canal normal.   Left Ear: Hearing, tympanic membrane, external ear and ear canal normal.   Nose: Nose normal. No mucosal edema, rhinorrhea or nasal deformity. No epistaxis. Right sinus exhibits no maxillary sinus tenderness and no frontal sinus tenderness. Left sinus exhibits no maxillary sinus tenderness and no frontal sinus tenderness.   Mouth/Throat: Uvula is midline, oropharynx is clear and moist and mucous membranes are normal. Mucous membranes are moist. No trismus in the jaw. Normal dentition. No uvula swelling. No posterior oropharyngeal erythema. Oropharynx is clear.   Eyes: Conjunctivae and lids are normal. Pupils are equal, round, and reactive to light. Right eye exhibits no discharge. Left eye exhibits no discharge. No scleral icterus. Extraocular movement intact   Neck: Trachea normal and phonation normal. Neck supple.   Cardiovascular: " Normal rate, regular rhythm, normal heart sounds and normal pulses.   Pulmonary/Chest: Effort normal and breath sounds normal. No respiratory distress.   Abdominal: Normal appearance and bowel sounds are normal. He exhibits no distension and no mass. Soft. There is no abdominal tenderness.   Musculoskeletal: Normal range of motion.         General: No deformity. Normal range of motion.      Comments: Tenderness at right achilles area, no warmth, no swelling  Able to bear full weight     Neurological: He is alert and oriented to person, place, and time. He exhibits normal muscle tone. Coordination normal.   Skin: Skin is warm, dry, intact, not diaphoretic and not pale.   Psychiatric: His speech is normal and behavior is normal. Judgment and thought content normal.   Nursing note and vitals reviewed.      Assessment:       1. Achilles tendinitis, unspecified laterality          Plan:         Achilles tendinitis, unspecified laterality    Other orders  -     ketorolac injection 30 mg  -     indomethacin (INDOCIN) 50 MG capsule; Take 1 capsule (50 mg total) by mouth 3 (three) times daily.  Dispense: 30 capsule; Refill: 1

## 2022-11-19 ENCOUNTER — OFFICE VISIT (OUTPATIENT)
Dept: URGENT CARE | Facility: CLINIC | Age: 57
End: 2022-11-19
Payer: MEDICAID

## 2022-11-19 VITALS
RESPIRATION RATE: 18 BRPM | OXYGEN SATURATION: 100 % | BODY MASS INDEX: 29.82 KG/M2 | SYSTOLIC BLOOD PRESSURE: 138 MMHG | WEIGHT: 190 LBS | TEMPERATURE: 98 F | DIASTOLIC BLOOD PRESSURE: 78 MMHG | HEIGHT: 67 IN | HEART RATE: 72 BPM

## 2022-11-19 DIAGNOSIS — M79.671 RIGHT FOOT PAIN: Primary | ICD-10-CM

## 2022-11-19 PROCEDURE — 4010F ACE/ARB THERAPY RXD/TAKEN: CPT | Mod: CPTII,S$GLB,, | Performed by: PHYSICIAN ASSISTANT

## 2022-11-19 PROCEDURE — 3075F PR MOST RECENT SYSTOLIC BLOOD PRESS GE 130-139MM HG: ICD-10-PCS | Mod: CPTII,S$GLB,, | Performed by: PHYSICIAN ASSISTANT

## 2022-11-19 PROCEDURE — 1160F PR REVIEW ALL MEDS BY PRESCRIBER/CLIN PHARMACIST DOCUMENTED: ICD-10-PCS | Mod: CPTII,S$GLB,, | Performed by: PHYSICIAN ASSISTANT

## 2022-11-19 PROCEDURE — 3008F PR BODY MASS INDEX (BMI) DOCUMENTED: ICD-10-PCS | Mod: CPTII,S$GLB,, | Performed by: PHYSICIAN ASSISTANT

## 2022-11-19 PROCEDURE — 1159F PR MEDICATION LIST DOCUMENTED IN MEDICAL RECORD: ICD-10-PCS | Mod: CPTII,S$GLB,, | Performed by: PHYSICIAN ASSISTANT

## 2022-11-19 PROCEDURE — 3078F PR MOST RECENT DIASTOLIC BLOOD PRESSURE < 80 MM HG: ICD-10-PCS | Mod: CPTII,S$GLB,, | Performed by: PHYSICIAN ASSISTANT

## 2022-11-19 PROCEDURE — 73630 XR FOOT COMPLETE 3 VIEW RIGHT: ICD-10-PCS | Mod: FY,RT,S$GLB, | Performed by: RADIOLOGY

## 2022-11-19 PROCEDURE — 99213 PR OFFICE/OUTPT VISIT, EST, LEVL III, 20-29 MIN: ICD-10-PCS | Mod: S$GLB,,, | Performed by: PHYSICIAN ASSISTANT

## 2022-11-19 PROCEDURE — 3078F DIAST BP <80 MM HG: CPT | Mod: CPTII,S$GLB,, | Performed by: PHYSICIAN ASSISTANT

## 2022-11-19 PROCEDURE — 3075F SYST BP GE 130 - 139MM HG: CPT | Mod: CPTII,S$GLB,, | Performed by: PHYSICIAN ASSISTANT

## 2022-11-19 PROCEDURE — 3008F BODY MASS INDEX DOCD: CPT | Mod: CPTII,S$GLB,, | Performed by: PHYSICIAN ASSISTANT

## 2022-11-19 PROCEDURE — 1159F MED LIST DOCD IN RCRD: CPT | Mod: CPTII,S$GLB,, | Performed by: PHYSICIAN ASSISTANT

## 2022-11-19 PROCEDURE — 1160F RVW MEDS BY RX/DR IN RCRD: CPT | Mod: CPTII,S$GLB,, | Performed by: PHYSICIAN ASSISTANT

## 2022-11-19 PROCEDURE — 73630 X-RAY EXAM OF FOOT: CPT | Mod: FY,RT,S$GLB, | Performed by: RADIOLOGY

## 2022-11-19 PROCEDURE — 99213 OFFICE O/P EST LOW 20 MIN: CPT | Mod: S$GLB,,, | Performed by: PHYSICIAN ASSISTANT

## 2022-11-19 PROCEDURE — 4010F PR ACE/ARB THEARPY RXD/TAKEN: ICD-10-PCS | Mod: CPTII,S$GLB,, | Performed by: PHYSICIAN ASSISTANT

## 2022-11-19 RX ORDER — METHYLPREDNISOLONE 4 MG/1
TABLET ORAL
Qty: 21 EACH | Refills: 0 | Status: SHIPPED | OUTPATIENT
Start: 2022-11-19 | End: 2022-12-10

## 2022-11-19 NOTE — PROGRESS NOTES
"Subjective:       Patient ID: Todd Sevilla is a 56 y.o. male.    Vitals:  height is 5' 7" (1.702 m) and weight is 86.2 kg (190 lb). His temperature is 97.8 °F (36.6 °C). His blood pressure is 138/78 and his pulse is 72. His respiration is 18 and oxygen saturation is 100%.     Chief Complaint: foot pain    Patient presents to clinic with  right foot pain, foot appears to be red and swollen,pt  states he was here 11/15/2022 and is still having some issues with his foot.     Provider note: patient does not wish to use an     Foot Injury   The incident occurred more than 1 week ago. There was no injury mechanism. The pain is present in the right foot. The pain is at a severity of 5/10. The pain is moderate. The pain has been Constant since onset. Associated symptoms include an inability to bear weight. Pertinent negatives include no numbness. He reports no foreign bodies present. The treatment provided no relief.     Constitution: Negative for appetite change, chills, sweating, fatigue and fever.   Neck: Negative for neck pain, neck stiffness and painful lymph nodes.   Cardiovascular:  Negative for chest pain and sob on exertion.   Respiratory:  Negative for cough and shortness of breath.    Musculoskeletal:  Positive for pain, joint pain, joint swelling and pain with walking. Negative for trauma and abnormal ROM of joint.   Skin:  Negative for color change, pale, rash, erythema and bruising.   Neurological:  Negative for loss of balance, disorientation, altered mental status, numbness, tingling and tremors.   Hematologic/Lymphatic: Negative for swollen lymph nodes and easy bruising/bleeding. Does not bruise/bleed easily.   Psychiatric/Behavioral:  Negative for altered mental status, disorientation and confusion.    Past Medical History:   Diagnosis Date    Essential (primary) hypertension     High cholesterol        History reviewed. No pertinent surgical history.    Family History   Problem Relation " Age of Onset    Diabetes Father     Hypertension Father     Stroke Father        Social History     Socioeconomic History    Marital status: Single   Tobacco Use    Smoking status: Never    Smokeless tobacco: Never   Substance and Sexual Activity    Alcohol use: Yes     Comment: not often     Drug use: Never       Current Outpatient Medications   Medication Sig Dispense Refill    colchicine (COLCRYS) 0.6 mg tablet Take 1 tablet (0.6 mg total) by mouth once daily. for 5 days 5 tablet 0    diclofenac sodium (VOLTAREN) 1 % Gel Apply 2 g topically 2 (two) times daily as needed (for pain). 20 g 0    indomethacin (INDOCIN) 50 MG capsule Take 1 capsule (50 mg total) by mouth 3 (three) times daily. 30 capsule 1    lisinopriL (PRINIVIL,ZESTRIL) 5 MG tablet Take 1 tablet (5 mg total) by mouth once daily. 30 tablet 0    naproxen (NAPROSYN) 250 MG tablet Take 1 tablet (250 mg total) by mouth 2 (two) times daily as needed (for pain, take with meals). 15 tablet 0    rosuvastatin (CRESTOR) 20 MG tablet Take 1 tablet (20 mg total) by mouth once daily. 30 tablet 0     No current facility-administered medications for this visit.       Review of patient's allergies indicates:  No Known Allergies      Objective:      Physical Exam   Constitutional: He is oriented to person, place, and time.  Non-toxic appearance. He does not appear ill. No distress.   HENT:   Head: Normocephalic and atraumatic.   Ears:   Right Ear: External ear normal.   Left Ear: External ear normal.   Cardiovascular: Normal pulses.   Pulses:       Dorsalis pedis pulses are 2+ on the right side.        Posterior tibial pulses are 2+ on the right side.   Pulmonary/Chest: Effort normal.   Abdominal: Normal appearance.   Musculoskeletal:         General: Swelling and tenderness present. No deformity or signs of injury.      Right ankle: He exhibits normal range of motion, no swelling, no ecchymosis, no deformity and normal pulse. No tenderness. Achilles tendon exhibits  no defect and normal Soler's test results.      Right lower leg: Normal.      Right foot: Normal range of motion and normal capillary refill. Tenderness, bony tenderness and swelling present. No crepitus, deformity or laceration. There is effusion present. No bruising or atrophy present. Anterior drawer test: negative. Varus tilt test: negative.  Plantar foot sensation: normal.        Feet:    Neurological: He is alert and oriented to person, place, and time. He displays no weakness. No sensory deficit.   Skin: Skin is not diaphoretic, not pale and no rash. Capillary refill takes less than 2 seconds. not right footNo bruising and No erythema   Psychiatric: His behavior is normal. Mood normal.   Nursing note and vitals reviewed.  X-Ray Foot Complete 3 view Right    Result Date: 11/19/2022  EXAMINATION: XR FOOT COMPLETE 3 VIEW RIGHT CLINICAL HISTORY: . Pain in right foot TECHNIQUE: AP, lateral, and oblique views of the right foot were performed. COMPARISON: None FINDINGS: No definite evidence of acute fracture or dislocation.  Lisfranc joint appears congruent.  Joint spaces appear maintained.  Enthesopathic changes at the calcaneus.  No definite evidence of radiopaque foreign body.     No convincing evidence of acute fracture or dislocation. Electronically signed by: Derrick Neal Date:    11/19/2022 Time:    10:07        Assessment:       1. Right foot pain          Plan:     Results reviewed, called patient after with results no change in treatment.     Right foot pain  -     X-Ray Foot Complete 3 view Right; Future; Expected date: 11/19/2022  -     BANDAGE ELASTIC 4IN ACE NS  -     methylPREDNISolone (MEDROL DOSEPACK) 4 mg tablet; use as directed  Dispense: 21 each; Refill: 0       Patient Instructions   Keep ace bandage on for compression, use tylenol, ice and elevation. Will call you with the official read to the xray once available. Will send steroid pack, take with food since the current medication is not  giving you relief. Strains/sprains can take several weeks to heal if your symptoms do not get better please follow up with your primary care doctor for further care.

## 2022-11-19 NOTE — PATIENT INSTRUCTIONS
Keep ace bandage on for compression, use tylenol, ice and elevation. Will call you with the official read to the xray once available. Will send steroid pack, take with food since the current medication is not giving you relief. Strains/sprains can take several weeks to heal if your symptoms do not get better please follow up with your primary care doctor for further care.

## 2023-01-02 ENCOUNTER — OFFICE VISIT (OUTPATIENT)
Dept: URGENT CARE | Facility: CLINIC | Age: 58
End: 2023-01-02
Payer: MEDICAID

## 2023-01-02 VITALS
HEART RATE: 86 BPM | BODY MASS INDEX: 29.76 KG/M2 | OXYGEN SATURATION: 96 % | RESPIRATION RATE: 20 BRPM | TEMPERATURE: 99 F | WEIGHT: 190 LBS | DIASTOLIC BLOOD PRESSURE: 97 MMHG | SYSTOLIC BLOOD PRESSURE: 148 MMHG

## 2023-01-02 DIAGNOSIS — Z71.89 ADVICE GIVEN ABOUT COVID-19 VIRUS INFECTION: Primary | ICD-10-CM

## 2023-01-02 LAB
CTP QC/QA: YES
SARS-COV-2 AG RESP QL IA.RAPID: POSITIVE

## 2023-01-02 PROCEDURE — 1159F PR MEDICATION LIST DOCUMENTED IN MEDICAL RECORD: ICD-10-PCS | Mod: CPTII,S$GLB,, | Performed by: FAMILY MEDICINE

## 2023-01-02 PROCEDURE — 3080F DIAST BP >= 90 MM HG: CPT | Mod: CPTII,S$GLB,, | Performed by: FAMILY MEDICINE

## 2023-01-02 PROCEDURE — 99213 OFFICE O/P EST LOW 20 MIN: CPT | Mod: S$GLB,,, | Performed by: FAMILY MEDICINE

## 2023-01-02 PROCEDURE — 3080F PR MOST RECENT DIASTOLIC BLOOD PRESSURE >= 90 MM HG: ICD-10-PCS | Mod: CPTII,S$GLB,, | Performed by: FAMILY MEDICINE

## 2023-01-02 PROCEDURE — 1160F RVW MEDS BY RX/DR IN RCRD: CPT | Mod: CPTII,S$GLB,, | Performed by: FAMILY MEDICINE

## 2023-01-02 PROCEDURE — 3008F BODY MASS INDEX DOCD: CPT | Mod: CPTII,S$GLB,, | Performed by: FAMILY MEDICINE

## 2023-01-02 PROCEDURE — U0002 COVID-19 LAB TEST NON-CDC: HCPCS | Mod: QW,S$GLB,, | Performed by: FAMILY MEDICINE

## 2023-01-02 PROCEDURE — 1159F MED LIST DOCD IN RCRD: CPT | Mod: CPTII,S$GLB,, | Performed by: FAMILY MEDICINE

## 2023-01-02 PROCEDURE — 1160F PR REVIEW ALL MEDS BY PRESCRIBER/CLIN PHARMACIST DOCUMENTED: ICD-10-PCS | Mod: CPTII,S$GLB,, | Performed by: FAMILY MEDICINE

## 2023-01-02 PROCEDURE — U0002 SARS CORONAVIRUS 2 ANTIGEN POCT, MANUAL READ: ICD-10-PCS | Mod: QW,S$GLB,, | Performed by: FAMILY MEDICINE

## 2023-01-02 PROCEDURE — 3008F PR BODY MASS INDEX (BMI) DOCUMENTED: ICD-10-PCS | Mod: CPTII,S$GLB,, | Performed by: FAMILY MEDICINE

## 2023-01-02 PROCEDURE — 99213 PR OFFICE/OUTPT VISIT, EST, LEVL III, 20-29 MIN: ICD-10-PCS | Mod: S$GLB,,, | Performed by: FAMILY MEDICINE

## 2023-01-02 PROCEDURE — 3077F SYST BP >= 140 MM HG: CPT | Mod: CPTII,S$GLB,, | Performed by: FAMILY MEDICINE

## 2023-01-02 PROCEDURE — 3077F PR MOST RECENT SYSTOLIC BLOOD PRESSURE >= 140 MM HG: ICD-10-PCS | Mod: CPTII,S$GLB,, | Performed by: FAMILY MEDICINE

## 2023-01-02 RX ORDER — LISINOPRIL AND HYDROCHLOROTHIAZIDE 20; 25 MG/1; MG/1
TABLET ORAL
COMMUNITY

## 2023-01-02 RX ORDER — NIRMATRELVIR AND RITONAVIR 300-100 MG
KIT ORAL
COMMUNITY
Start: 2022-12-29

## 2023-01-02 RX ORDER — ROSUVASTATIN CALCIUM 5 MG/1
5 TABLET, COATED ORAL
COMMUNITY
Start: 2022-12-29

## 2023-01-02 NOTE — PROGRESS NOTES
Subjective:       Patient ID: Todd Sevilla is a 57 y.o. male.    Vitals:  weight is 86.2 kg (190 lb). His temperature is 98.7 °F (37.1 °C). His blood pressure is 148/97 (abnormal) and his pulse is 86. His respiration is 20 and oxygen saturation is 96%.     Chief Complaint: COVID-19 Concerns    Pt is complaining of headache, sore throat that started about 6 days. He said he knows he had covid and says he needs a negative test to return to work.      URI   This is a new problem. The current episode started in the past 7 days. There has been no fever. Associated symptoms include headaches, sinus pain and a sore throat. Pertinent negatives include no abdominal pain, chest pain, congestion, coughing, diarrhea, dysuria, ear pain, nausea, neck pain, plugged ear sensation, rash or sneezing.     HENT:  Positive for sinus pain and sore throat. Negative for ear pain and congestion.    Neck: Negative for neck pain.   Cardiovascular:  Negative for chest pain.   Respiratory:  Negative for cough.    Gastrointestinal:  Negative for abdominal pain, nausea and diarrhea.   Genitourinary:  Negative for dysuria.   Skin:  Negative for rash.   Allergic/Immunologic: Negative for sneezing.   Neurological:  Positive for headaches.     Objective:      Physical Exam   Constitutional: He does not appear ill. No distress. obesity  HENT:   Head: Normocephalic.   Cardiovascular: Normal rate, regular rhythm, normal heart sounds and normal pulses.   Abdominal: Normal appearance.   Neurological: He is alert.   Nursing note and vitals reviewed.      Assessment:       1. Advice given about COVID-19 virus infection          Plan:         Advice given about COVID-19 virus infection  -     SARS Coronavirus 2 Antigen, POCT Manual Read    Discussed need for full 5 days isolation and discussed indications for retesting

## 2023-05-07 ENCOUNTER — OFFICE VISIT (OUTPATIENT)
Dept: URGENT CARE | Facility: CLINIC | Age: 58
End: 2023-05-07
Payer: MEDICAID

## 2023-05-07 VITALS
TEMPERATURE: 98 F | WEIGHT: 190 LBS | HEIGHT: 67 IN | BODY MASS INDEX: 29.82 KG/M2 | OXYGEN SATURATION: 95 % | HEART RATE: 78 BPM | SYSTOLIC BLOOD PRESSURE: 159 MMHG | RESPIRATION RATE: 18 BRPM | DIASTOLIC BLOOD PRESSURE: 109 MMHG

## 2023-05-07 DIAGNOSIS — Z76.0 ENCOUNTER FOR MEDICATION REFILL: Primary | ICD-10-CM

## 2023-05-07 DIAGNOSIS — R03.0 ELEVATED BLOOD PRESSURE READING: ICD-10-CM

## 2023-05-07 PROCEDURE — 99213 OFFICE O/P EST LOW 20 MIN: CPT | Mod: S$GLB,,, | Performed by: NURSE PRACTITIONER

## 2023-05-07 PROCEDURE — 99213 PR OFFICE/OUTPT VISIT, EST, LEVL III, 20-29 MIN: ICD-10-PCS | Mod: S$GLB,,, | Performed by: NURSE PRACTITIONER

## 2023-05-07 RX ORDER — LISINOPRIL AND HYDROCHLOROTHIAZIDE 20; 25 MG/1; MG/1
1 TABLET ORAL DAILY
Qty: 30 TABLET | Refills: 0 | Status: SHIPPED | OUTPATIENT
Start: 2023-05-07 | End: 2023-06-06

## 2023-05-07 NOTE — PROGRESS NOTES
"Subjective:      Patient ID: Todd Sevilla is a 57 y.o. male.    Vitals:  height is 5' 7" (1.702 m) and weight is 86.2 kg (190 lb). His temperature is 98.1 °F (36.7 °C). His blood pressure is 159/109 (abnormal) and his pulse is 78. His respiration is 18 and oxygen saturation is 95%.     Chief Complaint: Medication Refill    Patient presents to the clinic with a need for a refill for lisinopril x today  Provider note below:  This is a 57 y.o. male who presents today with no complaints, patient here for medication refill, patient reports he ran out of his blood pressure medication 5 days ago unable to get in with his primary to get the refill for his medication, patient reports he notices BP little high today as he is not taking his medication from past 5 days, patient is asymptomatic, denies any exertional chest pain, denies any radiating neck or arm pain, patient reports he has been taking this medication and the same dosage for years,  denies fever, body aches or chills, denies cough, wheezing or shortness of breath, denies nausea, vomiting, diarrhea or abdominal pain, denies chest pain or dizziness positional lightheadedness, denies sore throat or trouble swallowing, denies loss of taste or smell, or any other symptoms        Medication Refill  This is a new problem. The current episode started yesterday. He has tried nothing for the symptoms.     Skin:  Negative for erythema.    Objective:     Physical Exam   Constitutional: He is oriented to person, place, and time. He appears well-developed.   HENT:   Head: Normocephalic and atraumatic. Head is without abrasion, without contusion and without laceration.   Ears:   Right Ear: External ear normal.   Left Ear: External ear normal.   Nose: Nose normal.   Mouth/Throat: Oropharynx is clear and moist and mucous membranes are normal.   Eyes: Conjunctivae, EOM and lids are normal. Pupils are equal, round, and reactive to light.   Neck: Trachea normal and phonation " normal. Neck supple.   Cardiovascular: Normal rate, regular rhythm and normal heart sounds.   Pulmonary/Chest: Effort normal and breath sounds normal. No stridor. No respiratory distress.   Musculoskeletal: Normal range of motion.         General: Normal range of motion.   Neurological: He is alert and oriented to person, place, and time.   Skin: Skin is warm, dry, intact and no rash. Capillary refill takes less than 2 seconds. No abrasion, No burn, No bruising, No erythema and No ecchymosis   Psychiatric: His speech is normal and behavior is normal. Judgment and thought content normal.   Nursing note and vitals reviewed.      Patient in no acute distress.  Vitals reassuring.  Discussed results/diagnosis/plan in depth with patient in clinic. Strict precautions given to patient to monitor for worsening signs and symptoms. Advised to follow up with primary.All questions answered. Strict ER precautions given. If your symptoms worsens or fail to improve you should go to the Emergency Room. Discharge and follow-up instructions given verbally/printed. Discharge and follow-up instructions discussed with the patient who expressed understanding and willingness to comply with my recommendations.Patient voiced understanding and in agreement with current treatment plan.     Please be advised this text was dictated with [employer] software and may contain errors due to translation.    Assessment:     1. Encounter for medication refill    2. Elevated blood pressure reading        Plan:       Encounter for medication refill    Elevated blood pressure reading    Other orders  -     lisinopriL-hydrochlorothiazide (PRINZIDE,ZESTORETIC) 20-25 mg Tab; Take 1 tablet by mouth once daily.  Dispense: 30 tablet; Refill: 0          Medical Decision Making:   Urgent Care Management:  Patient in no acute distress.  Vitals reassuring.  On exam, patient is nontoxic appearing and afebrile.  Lungs CTA.  Patient is symptomatic.  Presents with request  for medication refill for his BP medication then he ran out 5 days ago.  Unable to get in with his primary for the refill currently.  Due to work schedule.  Will prescribe the medication as patient presented with his medication bottle with the dosage.  Advised patient that he will need to follow-up with his primary for further refills.Medication prescribed and over-the-counter medication discussed with patient at length.  Proper hydration advised.  I reiterated the importance of further evaluation if no improvement symptoms and follow-up with primary. Patient voiced understanding and in agreement with current treatment plan.             Patient Instructions   If your condition worsens or fails to improve we recommend that you receive another evaluation at the ER immediately or contact your PCP to discuss your concerns or return here. You must understand that you've received an urgent care treatment only and that you may be released before all your medical problems are known or treated. You the patient will arrange for followup care as instructed.    If you were prescribed antibiotics, please take them to completion.  If you were prescribed a narcotic medication, do not drive or operate heavy equipment or machinery while taking these medications.  Please follow up with your primary care doctor or specialist as needed.  If you  smoke, please stop smoking.

## 2023-07-22 ENCOUNTER — OFFICE VISIT (OUTPATIENT)
Dept: URGENT CARE | Facility: CLINIC | Age: 58
End: 2023-07-22
Payer: MEDICAID

## 2023-07-22 VITALS
OXYGEN SATURATION: 95 % | HEIGHT: 67 IN | TEMPERATURE: 100 F | WEIGHT: 190 LBS | HEART RATE: 118 BPM | DIASTOLIC BLOOD PRESSURE: 90 MMHG | BODY MASS INDEX: 29.82 KG/M2 | SYSTOLIC BLOOD PRESSURE: 149 MMHG | RESPIRATION RATE: 18 BRPM

## 2023-07-22 DIAGNOSIS — R50.9 FEVER, UNSPECIFIED FEVER CAUSE: ICD-10-CM

## 2023-07-22 DIAGNOSIS — U07.1 COVID: Primary | ICD-10-CM

## 2023-07-22 LAB
CTP QC/QA: YES
CTP QC/QA: YES
POC MOLECULAR INFLUENZA A AGN: NEGATIVE
POC MOLECULAR INFLUENZA B AGN: NEGATIVE
SARS-COV-2 AG RESP QL IA.RAPID: POSITIVE

## 2023-07-22 PROCEDURE — 99214 PR OFFICE/OUTPT VISIT, EST, LEVL IV, 30-39 MIN: ICD-10-PCS | Mod: S$GLB,,,

## 2023-07-22 PROCEDURE — 99214 OFFICE O/P EST MOD 30 MIN: CPT | Mod: S$GLB,,,

## 2023-07-22 PROCEDURE — 87502 POCT INFLUENZA A/B MOLECULAR: ICD-10-PCS | Mod: QW,S$GLB,,

## 2023-07-22 PROCEDURE — 87811 SARS-COV-2 COVID19 W/OPTIC: CPT | Mod: QW,S$GLB,,

## 2023-07-22 PROCEDURE — 87502 INFLUENZA DNA AMP PROBE: CPT | Mod: QW,S$GLB,,

## 2023-07-22 PROCEDURE — 87811 SARS CORONAVIRUS 2 ANTIGEN POCT, MANUAL READ: ICD-10-PCS | Mod: QW,S$GLB,,

## 2023-07-22 RX ORDER — ACETAMINOPHEN 500 MG
1000 TABLET ORAL
Status: DISCONTINUED | OUTPATIENT
Start: 2023-07-22 | End: 2023-07-22

## 2023-07-22 RX ORDER — BENZONATATE 100 MG/1
100 CAPSULE ORAL 3 TIMES DAILY PRN
Qty: 30 CAPSULE | Refills: 0 | Status: SHIPPED | OUTPATIENT
Start: 2023-07-22 | End: 2023-08-01

## 2023-07-22 RX ORDER — IBUPROFEN 200 MG
400 TABLET ORAL
Status: COMPLETED | OUTPATIENT
Start: 2023-07-22 | End: 2023-07-22

## 2023-07-22 RX ADMIN — Medication 400 MG: at 09:07

## 2023-07-22 NOTE — PATIENT INSTRUCTIONS
Please stop taking Crestor while taking Paxlovid.    You have tested positive for COVID-19 today.      ISOLATION  If you tested positive and do not have symptoms, you must isolate for 5 days starting on the day of the positive test. I    If you tested positive and have symptoms, you must isolate for 5 days starting on the day of the first symptoms,  not the day of the positive test.     Both the CDC and the [facility] emphasize that you do not test out of isolation.     After 5 days, if your symptoms have improved and you have not had fever on day 5, you can return to the community on day 6- NO TESTING REQUIRED!      In fact, we do not retest if you were positive in the last 90 days.    After your 5 days of isolation are completed, the CDC recommends strict mask use for the first 5 days that you come out of isolation.    Patient Instructions   PLEASE READ YOUR DISCHARGE INSTRUCTIONS ENTIRELY AS IT CONTAINS IMPORTANT INFORMATION.     Please drink plenty of fluids.     Please get plenty of rest.     Please return here or go to the Emergency Department for any concerns or worsening of condition.     Please take an over the counter antihistamine medication (allegra/Claritin/Zyrtec) of your choice as directed.     If not allergic, please take over the counter Tylenol (Acetaminophen) and/or Motrin (Ibuprofen) as directed for control of pain and/or fever.  Please follow up with your primary care doctor or specialist as needed.     Sore throat recommendations: Warm fluids, warm salt water gargles, throat lozenges, tea, honey, soup, rest, hydration.     Use over the counter flonase: one spray each nostril twice daily OR two sprays each nostril once daily.      If you  smoke, please stop smoking.     Please return or see your primary care doctor if you develop new or worsening symptoms.      Please arrange follow up with your primary medical clinic as soon as possible. You must understand that you've received an Urgent Care  treatment only and that you may be released before all of your medical problems are known or treated. You, the patient, will arrange for follow up as instructed. If your symptoms worsen or fail to improve you should go to the Emergency Room.

## 2023-07-22 NOTE — LETTER
July 22, 2023      [facility]  [address] [address]  Phone: [phone]  Fax: [phone]       Patient: Todd Sevilla   YOB: 1965  Date of Visit: 07/22/2023    To Whom It May Concern:    Ariel Sevilla  was at [facility] on 07/22/2023. The patient may return to work/school on 7/26/23 with no restrictions. If you have any questions or concerns, or if I can be of further assistance, please do not hesitate to contact me.    Sincerely,    Deneen Rivera, NP

## 2023-07-22 NOTE — LETTER
"Todd Henry"Di was seen and treated in our urgent care on 7/22/2023.     COVID-19 is present in our communities across the state. In this situation, your employee meets the following criteria:    Todd Sevilla has met the criteria for COVID-19 testing and has a POSITIVE result. He can return to work once they are asymptomatic for 24 hours without the use of fever reducing medications AND at least five days from the start of symptoms (or from the first positive result if they have no symptoms).           "

## 2023-07-22 NOTE — PROGRESS NOTES
"Subjective:      Patient ID: Todd Sevilla is a 57 y.o. male.    Vitals:  height is 5' 7" (1.702 m) and weight is 86.2 kg (190 lb). His temperature is 100.4 °F (38 °C) (abnormal). His blood pressure is 149/90 (abnormal) and his pulse is 128 (abnormal). His respiration is 18 and oxygen saturation is 95%.     Chief Complaint: Cough (Chills fever eyes burning body aches vomiting)    57 yr male pt presents to the clinic with cough, chills, fever, itchy eyes, body aches, vomiting, and headache. Pt reports her onset is Thursday. Treatments at home include tylenol and Vitamin C.     Cough  This is a new problem. The current episode started in the past 7 days. The problem has been gradually worsening. The problem occurs every few minutes. The cough is Productive of sputum. Associated symptoms include chills, a fever, headaches, nasal congestion and a sore throat. Pertinent negatives include no chest pain, shortness of breath or wheezing. Nothing aggravates the symptoms. He has tried OTC cough suppressant (tylenol Vitamin C) for the symptoms. The treatment provided no relief.     Constitution: Positive for chills and fever.   HENT:  Positive for sore throat.    Cardiovascular:  Negative for chest pain.   Respiratory:  Positive for cough. Negative for shortness of breath and wheezing.    Gastrointestinal:  Positive for nausea and vomiting. Negative for diarrhea.   Neurological:  Positive for headaches. Negative for dizziness and light-headedness.    Objective:     Physical Exam   Constitutional: He is oriented to person, place, and time. He appears well-developed. He is cooperative.  Non-toxic appearance. He appears ill. No distress.   HENT:   Head: Normocephalic and atraumatic.   Ears:   Right Ear: Hearing, tympanic membrane, external ear and ear canal normal.   Left Ear: Hearing, tympanic membrane, external ear and ear canal normal.   Nose: Nose normal. No mucosal edema, rhinorrhea or nasal deformity. Right sinus " exhibits no maxillary sinus tenderness and no frontal sinus tenderness. Left sinus exhibits no maxillary sinus tenderness and no frontal sinus tenderness.   Mouth/Throat: Uvula is midline and mucous membranes are normal. No trismus in the jaw. No uvula swelling. Posterior oropharyngeal erythema present. No oropharyngeal exudate or posterior oropharyngeal edema.   Eyes: Conjunctivae and lids are normal.   Neck: Trachea normal and phonation normal. Neck supple. No edema present. No erythema present. No neck rigidity present.   Cardiovascular: Normal rate, regular rhythm, normal heart sounds and normal pulses.   Pulmonary/Chest: Effort normal and breath sounds normal. No respiratory distress. He has no decreased breath sounds. He has no wheezes. He has no rhonchi.   Abdominal: Normal appearance.   Musculoskeletal: Normal range of motion.         General: Normal range of motion.   Neurological: He is alert and oriented to person, place, and time. He exhibits normal muscle tone.   Skin: Skin is warm, dry, intact and not diaphoretic.   Psychiatric: His speech is normal and behavior is normal. Judgment and thought content normal.   Nursing note and vitals reviewed.  Results for orders placed or performed in visit on 07/22/23   SARS Coronavirus 2 Antigen, POCT Manual Read   Result Value Ref Range    SARS Coronavirus 2 Antigen Positive (A) Negative     Acceptable Yes      Assessment:     1. COVID    2. Fever, unspecified fever cause        Plan:       COVID  -     SARS Coronavirus 2 Antigen, POCT Manual Read  -     POCT Influenza A/B MOLECULAR  -     nirmatrelvir-ritonavir 300 mg (150 mg x 2)-100 mg copackaged tablets (EUA); Take 3 tablets by mouth 2 (two) times daily for 5 days. Each dose contains 2 nirmatrelvir (pink tablets) and 1 ritonavir (white tablet). Take all 3 tablets together  Dispense: 30 tablet; Refill: 0    Fever, unspecified fever cause  -     ibuprofen tablet 400 mg    Other orders  -      Discontinue: acetaminophen tablet 1,000 mg      Pt in no acute distress. Temp 100.4, ibuprofen administered for fever. , down to 118 10 minutes after ibuprofen administration. Reviewed positive COVID test results. COVID risk score 1. Paxlovid treatment discussed in detail including EUA and side effects, pt would like to continue with treatment. Drug interactions checked on [facility] website. Discussed stopping crestor while taking paxlovid. Discussed the importance of further evaluation if symptoms worsen. Patient stated verbal understanding.    Patient Instructions   Please stop taking Crestor while taking Paxlovid.    You have tested positive for COVID-19 today.      ISOLATION  If you tested positive and do not have symptoms, you must isolate for 5 days starting on the day of the positive test. I    If you tested positive and have symptoms, you must isolate for 5 days starting on the day of the first symptoms,  not the day of the positive test.     Both the CDC and the [facility] emphasize that you do not test out of isolation.     After 5 days, if your symptoms have improved and you have not had fever on day 5, you can return to the community on day 6- NO TESTING REQUIRED!      In fact, we do not retest if you were positive in the last 90 days.    After your 5 days of isolation are completed, the CDC recommends strict mask use for the first 5 days that you come out of isolation.    Patient Instructions   PLEASE READ YOUR DISCHARGE INSTRUCTIONS ENTIRELY AS IT CONTAINS IMPORTANT INFORMATION.     Please drink plenty of fluids.     Please get plenty of rest.     Please return here or go to the Emergency Department for any concerns or worsening of condition.     Please take an over the counter antihistamine medication (allegra/Claritin/Zyrtec) of your choice as directed.     If not allergic, please take over the counter Tylenol (Acetaminophen) and/or Motrin (Ibuprofen) as directed for control of pain and/or  fever.  Please follow up with your primary care doctor or specialist as needed.     Sore throat recommendations: Warm fluids, warm salt water gargles, throat lozenges, tea, honey, soup, rest, hydration.     Use over the counter flonase: one spray each nostril twice daily OR two sprays each nostril once daily.      If you  smoke, please stop smoking.     Please return or see your primary care doctor if you develop new or worsening symptoms.      Please arrange follow up with your primary medical clinic as soon as possible. You must understand that you've received an Urgent Care treatment only and that you may be released before all of your medical problems are known or treated. You, the patient, will arrange for follow up as instructed. If your symptoms worsen or fail to improve you should go to the Emergency Room.

## 2023-08-17 ENCOUNTER — OFFICE VISIT (OUTPATIENT)
Dept: URGENT CARE | Facility: CLINIC | Age: 58
End: 2023-08-17
Payer: MEDICAID

## 2023-08-17 VITALS
HEIGHT: 67 IN | OXYGEN SATURATION: 95 % | TEMPERATURE: 98 F | SYSTOLIC BLOOD PRESSURE: 142 MMHG | DIASTOLIC BLOOD PRESSURE: 94 MMHG | WEIGHT: 190 LBS | BODY MASS INDEX: 29.82 KG/M2 | HEART RATE: 83 BPM | RESPIRATION RATE: 18 BRPM

## 2023-08-17 DIAGNOSIS — S93.602A FOOT SPRAIN, LEFT, INITIAL ENCOUNTER: ICD-10-CM

## 2023-08-17 DIAGNOSIS — M79.672 PAIN IN LEFT FOOT: Primary | ICD-10-CM

## 2023-08-17 PROCEDURE — 73630 X-RAY EXAM OF FOOT: CPT | Mod: FY,LT,S$GLB, | Performed by: RADIOLOGY

## 2023-08-17 PROCEDURE — 99214 OFFICE O/P EST MOD 30 MIN: CPT | Mod: S$GLB,,, | Performed by: FAMILY MEDICINE

## 2023-08-17 PROCEDURE — 73630 XR FOOT COMPLETE 3 VIEW LEFT: ICD-10-PCS | Mod: FY,LT,S$GLB, | Performed by: RADIOLOGY

## 2023-08-17 PROCEDURE — 99214 PR OFFICE/OUTPT VISIT, EST, LEVL IV, 30-39 MIN: ICD-10-PCS | Mod: S$GLB,,, | Performed by: FAMILY MEDICINE

## 2023-08-17 RX ORDER — INDOMETHACIN 50 MG/1
50 CAPSULE ORAL 3 TIMES DAILY
Qty: 30 CAPSULE | Refills: 1 | Status: SHIPPED | OUTPATIENT
Start: 2023-08-17

## 2023-08-17 NOTE — PROGRESS NOTES
"Subjective:      Patient ID: Todd Sevilla is a 57 y.o. male.    Vitals:  height is 5' 7" (1.702 m) and weight is 86.2 kg (190 lb). His oral temperature is 98.4 °F (36.9 °C). His blood pressure is 142/94 (abnormal) and his pulse is 83. His respiration is 18 and oxygen saturation is 95%.     Chief Complaint: Foot Swelling (Left foot pain and swelling)    57 year old patient presenting with left foot pain and swelling x 1 day, patient denies any trauma to it, but states it's possible hes not too sure  No hx of gout      Leg Pain   The incident occurred 6 to 12 hours ago. The incident occurred at home. There was no injury mechanism. The pain is present in the left foot. The quality of the pain is described as aching and shooting. The pain is at a severity of 8/10. The pain is moderate. The pain has been Constant since onset. Associated symptoms include an inability to bear weight. He reports no foreign bodies present. The symptoms are aggravated by movement, palpation and weight bearing. He has tried NSAIDs for the symptoms. The treatment provided mild relief.     ROS   Objective:     Physical Exam   Constitutional: He is oriented to person, place, and time. He appears well-developed. He is cooperative.   HENT:   Head: Normocephalic and atraumatic.   Ears:   Right Ear: Hearing, tympanic membrane, external ear and ear canal normal.   Left Ear: Hearing, tympanic membrane, external ear and ear canal normal.   Nose: Nose normal. No mucosal edema or nasal deformity. No epistaxis. Right sinus exhibits no maxillary sinus tenderness and no frontal sinus tenderness. Left sinus exhibits no maxillary sinus tenderness and no frontal sinus tenderness.   Mouth/Throat: Uvula is midline, oropharynx is clear and moist and mucous membranes are normal. Mucous membranes are moist. No trismus in the jaw. Normal dentition. No uvula swelling. Oropharynx is clear.   Eyes: Conjunctivae and lids are normal.   Neck: Trachea normal and " phonation normal. Neck supple.   Cardiovascular: Normal rate, regular rhythm, normal heart sounds and normal pulses.   Pulmonary/Chest: Effort normal and breath sounds normal.   Abdominal: Normal appearance and bowel sounds are normal. Soft.   Musculoskeletal: Normal range of motion.         General: Normal range of motion.      Comments: Left foot lateral aspect with mild swelling and tenderness  Able to bear partial weight on  No warmth or erythema     Neurological: He is alert and oriented to person, place, and time. He exhibits normal muscle tone.   Skin: Skin is warm, dry and intact.   Psychiatric: His speech is normal and behavior is normal. Judgment and thought content normal.   Nursing note and vitals reviewed.      Assessment:     1. Pain in left foot    2. Foot sprain, left, initial encounter        Plan:       Pain in left foot  -     X-Ray Foot Complete Left; Future; Expected date: 08/17/2023    Foot sprain, left, initial encounter    Other orders  -     indomethacin (INDOCIN) 50 MG capsule; Take 1 capsule (50 mg total) by mouth 3 (three) times daily.  Dispense: 30 capsule; Refill: 1         X-ray shows no obvious fx,     Probable Left foot sprain  Ice, Ace wrap

## 2023-08-17 NOTE — LETTER
August 17, 2023      [facility]  [address] [address]  Phone: [phone]  Fax: [phone]       Patient: Todd Sevilla   YOB: 1965  Date of Visit: 08/17/2023    To Whom It May Concern:    Ariel Sevilla  was at [facility] on 08/17/2023. The patient may return to work/school on 8/18/23 with no restrictions. If you have any questions or concerns, or if I can be of further assistance, please do not hesitate to contact me.    Sincerely,    Monse Lamb MD

## 2023-08-17 NOTE — LETTER
August 18, 2023      [facility]  [address] [address]  Phone: [phone]  Fax: [phone]       Patient: Todd Sevilla   YOB: 1965  Date of Visit: 08/18/2023    To Whom It May Concern:    Renee Sevilla  was at [facility] on 08/18/2023. The patient may return to work/school on 08/19/2023 with no restrictions. If you have any questions or concerns, or if I can be of further assistance, please do not hesitate to contact me.    Sincerely,    Jemal Davis MA

## 2023-08-18 ENCOUNTER — TELEPHONE (OUTPATIENT)
Dept: URGENT CARE | Facility: CLINIC | Age: 58
End: 2023-08-18
Payer: MEDICAID

## 2023-08-18 NOTE — TELEPHONE ENCOUNTER
Patient presented to clinic today requesting work excuse.  He was seen by Dr. Hopkins yesterday for foot pain and was given work excuse to return today.  Patient states he is not able returned today secondary to pain.  PAR did give work excuse for today.  Discussed that this will be time work extension excuse.

## 2023-08-20 ENCOUNTER — CLINICAL SUPPORT (OUTPATIENT)
Dept: URGENT CARE | Facility: CLINIC | Age: 58
End: 2023-08-20
Payer: MEDICAID

## 2023-08-20 VITALS
RESPIRATION RATE: 18 BRPM | TEMPERATURE: 99 F | BODY MASS INDEX: 29.82 KG/M2 | HEIGHT: 67 IN | OXYGEN SATURATION: 96 % | DIASTOLIC BLOOD PRESSURE: 89 MMHG | HEART RATE: 84 BPM | SYSTOLIC BLOOD PRESSURE: 138 MMHG | WEIGHT: 190 LBS

## 2023-08-20 DIAGNOSIS — M79.672 PAIN IN LEFT FOOT: ICD-10-CM

## 2023-08-20 DIAGNOSIS — M25.561 ACUTE PAIN OF RIGHT KNEE: Primary | ICD-10-CM

## 2023-08-20 PROCEDURE — 99213 OFFICE O/P EST LOW 20 MIN: CPT | Mod: S$GLB,,, | Performed by: FAMILY MEDICINE

## 2023-08-20 PROCEDURE — 99213 PR OFFICE/OUTPT VISIT, EST, LEVL III, 20-29 MIN: ICD-10-PCS | Mod: S$GLB,,, | Performed by: FAMILY MEDICINE

## 2023-08-20 RX ORDER — KETOROLAC TROMETHAMINE 30 MG/ML
30 INJECTION, SOLUTION INTRAMUSCULAR; INTRAVENOUS
Status: COMPLETED | OUTPATIENT
Start: 2023-08-20 | End: 2023-08-20

## 2023-08-20 RX ORDER — METHYLPREDNISOLONE 4 MG/1
TABLET ORAL
Qty: 21 EACH | Refills: 0 | Status: SHIPPED | OUTPATIENT
Start: 2023-08-20 | End: 2023-09-10

## 2023-08-20 RX ADMIN — KETOROLAC TROMETHAMINE 30 MG: 30 INJECTION, SOLUTION INTRAMUSCULAR; INTRAVENOUS at 10:08

## 2023-08-20 NOTE — PROGRESS NOTES
"Subjective:      Patient ID: Todd Sevilla is a 57 y.o. male.    Vitals:  height is 5' 7" (1.702 m) and weight is 86.2 kg (190 lb). His oral temperature is 98.8 °F (37.1 °C). His blood pressure is 138/89 and his pulse is 84. His respiration is 18 and oxygen saturation is 96%.     Chief Complaint: Knee Pain (Right knee pain left foot pain)    57 yr male present with follow up. Pt states that he was seen on Thursday for the same problem but states that the medication are not working. Pt states that it was only his left foot on Thursday but now its his right knee that is hurting too.   Denies any injury or trauma, minimal relilef with diclofenac      Knee Pain   The incident occurred 3 to 5 days ago. The incident occurred at home. There was no injury mechanism. The pain is present in the right knee. The pain is at a severity of 8/10. The pain is moderate. The pain has been Constant since onset. Associated symptoms include an inability to bear weight. Pertinent negatives include no numbness or tingling. He reports no foreign bodies present. The symptoms are aggravated by movement and weight bearing. The treatment provided no relief.       Neurological:  Negative for numbness.      Objective:     Physical Exam   Constitutional: He is oriented to person, place, and time. He appears well-developed. He is cooperative.   HENT:   Head: Normocephalic and atraumatic.   Ears:   Right Ear: Hearing, tympanic membrane, external ear and ear canal normal.   Left Ear: Hearing, tympanic membrane, external ear and ear canal normal.   Nose: Nose normal. No mucosal edema or nasal deformity. No epistaxis. Right sinus exhibits no maxillary sinus tenderness and no frontal sinus tenderness. Left sinus exhibits no maxillary sinus tenderness and no frontal sinus tenderness.   Mouth/Throat: Uvula is midline, oropharynx is clear and moist and mucous membranes are normal. No trismus in the jaw. Normal dentition. No uvula swelling.   Eyes: " Conjunctivae, EOM and lids are normal. Pupils are equal, round, and reactive to light.   Neck: Trachea normal and phonation normal. Neck supple.   Cardiovascular: Normal rate, regular rhythm, normal heart sounds and normal pulses.   Pulmonary/Chest: Effort normal and breath sounds normal.   Abdominal: Normal appearance and bowel sounds are normal. Soft.   Musculoskeletal: Normal range of motion.         General: Normal range of motion.      Comments: Left foot no swelling or warmth, ankle not swollen  Able to bear weight on  Right knee minimal tenderness, anterior drawer test  No warmth      Neurological: He is alert and oriented to person, place, and time. He exhibits normal muscle tone.   Skin: Skin is warm, dry and intact.   Psychiatric: His speech is normal and behavior is normal. Judgment and thought content normal.   Nursing note and vitals reviewed.      Assessment:     1. Acute pain of right knee    2. Pain in left foot        Plan:       Acute pain of right knee    Pain in left foot    Other orders  -     ketorolac injection 30 mg  -     methylPREDNISolone (MEDROL DOSEPACK) 4 mg tablet; use as directed  Dispense: 21 each; Refill: 0

## 2023-11-28 ENCOUNTER — OFFICE VISIT (OUTPATIENT)
Dept: URGENT CARE | Facility: CLINIC | Age: 58
End: 2023-11-28
Payer: MEDICAID

## 2023-11-28 VITALS
HEART RATE: 90 BPM | DIASTOLIC BLOOD PRESSURE: 80 MMHG | SYSTOLIC BLOOD PRESSURE: 127 MMHG | BODY MASS INDEX: 29.82 KG/M2 | TEMPERATURE: 98 F | RESPIRATION RATE: 17 BRPM | WEIGHT: 190 LBS | HEIGHT: 67 IN | OXYGEN SATURATION: 96 %

## 2023-11-28 DIAGNOSIS — M25.461 EFFUSION OF RIGHT KNEE: Primary | ICD-10-CM

## 2023-11-28 DIAGNOSIS — M17.11 PRIMARY OSTEOARTHRITIS OF RIGHT KNEE: ICD-10-CM

## 2023-11-28 PROCEDURE — 99213 PR OFFICE/OUTPT VISIT, EST, LEVL III, 20-29 MIN: ICD-10-PCS | Mod: S$GLB,,,

## 2023-11-28 PROCEDURE — 99213 OFFICE O/P EST LOW 20 MIN: CPT | Mod: S$GLB,,,

## 2023-11-28 RX ORDER — KETOROLAC TROMETHAMINE 30 MG/ML
30 INJECTION, SOLUTION INTRAMUSCULAR; INTRAVENOUS ONCE
Status: COMPLETED | OUTPATIENT
Start: 2023-11-28 | End: 2023-11-28

## 2023-11-28 RX ORDER — IBUPROFEN 800 MG/1
800 TABLET ORAL 3 TIMES DAILY
Qty: 30 TABLET | Refills: 0 | Status: SHIPPED | OUTPATIENT
Start: 2023-11-28 | End: 2023-12-08

## 2023-11-28 RX ADMIN — KETOROLAC TROMETHAMINE 30 MG: 30 INJECTION, SOLUTION INTRAMUSCULAR; INTRAVENOUS at 08:11

## 2023-11-28 NOTE — PROGRESS NOTES
Subjective:      Patient ID: Todd Sevilla is a 57 y.o. male.    Vitals:  vitals were not taken for this visit.     Chief Complaint: No chief complaint on file.    HPI  ROS   Objective:     Physical Exam    Assessment:     No diagnosis found.    Plan:       There are no diagnoses linked to this encounter.

## 2023-11-28 NOTE — PATIENT INSTRUCTIONS
Please drink plenty of fluids.  Please get plenty of rest.  Please return here or go to the Emergency Department for any concerns or worsening of condition.  If you were prescribed a narcotic medication, do not drive or operate heavy equipment or machinery while taking these medications.  If you were not prescribed an anti-inflammatory medication, and if you do not have any history of stomach/intestinal ulcers, or kidney disease, or are not taking a blood thinner such as Coumadin, Plavix, Pradaxa, Eloquis, or Xaralta for example, it is OK to take over the counter Ibuprofen or Advil or Motrin or Aleve as directed.  Do not take these medications on an empty stomach.  Start Ibuprofen tomorrow, take after eating a full meal. Toradol Injection given in clinic today. Patient take OTC Tylenol for additional pain relief today.   Ortho referral placed.   Rest, ice, compression and elevation (above heart)  to the affected joint or limb as needed.  Please follow up with your primary care doctor or specialist as needed.    If you  smoke, please stop smoking.

## 2023-11-28 NOTE — LETTER
November 28, 2023      [facility]  [address] [address]  Phone: [phone]  Fax: [phone]       Patient: Todd Sevilla   YOB: 1965  Date of Visit: 11/28/2023    To Whom It May Concern:    Renee Sevilla  was at [facility] on 11/28/2023. The patient may return to work/school on 11/30/2023 with light duty. If you have any questions or concerns, or if I can be of further assistance, please do not hesitate to contact me.    Sincerely,            Tone Bourgeois MA

## 2023-11-28 NOTE — PROGRESS NOTES
"Subjective:      Patient ID: Todd Sevilla is a 57 y.o. male.    Vitals:  height is 5' 7" (1.702 m) and weight is 86.2 kg (190 lb). His oral temperature is 97.8 °F (36.6 °C). His blood pressure is 127/80 and his pulse is 90. His respiration is 17 and oxygen saturation is 96%.     Chief Complaint: Knee Pain    This is a 57 y.o. male who presents today with a chief complaint of right knee pain and swelling. Started 2 days ago, but it was mild. He notes that  today it has progressively worsened. He states he cannot walk very well due to pain and swelling. Pain is a constant 8/10 throbbing and pressure-like pain specifically on medial and lateral aspects of joint. No acute injury or trauma to the area. Patient does have a hx of DJD in right knee.  Patient is not using any devices to ambulate. Patient has not taken any medication for pain. Denies numbness or tingling. Denies radiation of pain. Denies fever, chills, body aches, chest pain, shortness of breath, abdominal pain, nausea, vomiting, diarrhea, or rashes.  used, #[id number].               Knee Pain   The incident occurred 12 to 24 hours ago. The incident occurred at home. There was no injury mechanism. The pain is present in the right leg. The quality of the pain is described as aching. The pain is at a severity of 10/10. The pain is severe. The pain has been Worsening since onset. Associated symptoms include an inability to bear weight, a loss of motion and muscle weakness. Pertinent negatives include no loss of sensation, numbness or tingling. He reports no foreign bodies present. The symptoms are aggravated by movement, palpation and weight bearing. He has tried nothing for the symptoms.     Constitution: Negative for activity change, appetite change, chills, sweating, fatigue, fever, unexpected weight change and generalized weakness.   HENT:  Negative for ear pain, ear discharge, foreign body in nose, postnasal drip, sinus pain, sinus pressure " and sore throat.    Neck: Negative for neck pain, neck stiffness and neck swelling.   Cardiovascular:  Negative for chest pain and palpitations.   Eyes:  Negative for eye discharge, eye itching, eye pain and eye redness.   Respiratory:  Negative for cough, sputum production, shortness of breath and wheezing.    Gastrointestinal:  Negative for abdominal pain, nausea, vomiting, constipation and diarrhea.   Genitourinary:  Negative for dysuria, frequency, urgency and urine decreased.   Musculoskeletal:  Positive for pain, joint pain, joint swelling and pain with walking.   Skin:  Negative for rash, erythema and bruising.   Allergic/Immunologic: Negative for environmental allergies, seasonal allergies and sneezing.   Neurological:  Negative for dizziness, passing out, headaches, disorientation and numbness.   Psychiatric/Behavioral:  Negative for disorientation and confusion.       Objective:     Physical Exam   Constitutional: He is oriented to person, place, and time. He appears well-developed. He is cooperative.  Non-toxic appearance. He does not appear ill. No distress.   HENT:   Head: Normocephalic and atraumatic. Head is without abrasion, without contusion and without laceration.   Ears:   Right Ear: Hearing, tympanic membrane, external ear and ear canal normal. No hemotympanum.   Left Ear: Hearing, tympanic membrane, external ear and ear canal normal. No hemotympanum.   Nose: Nose normal. No mucosal edema, rhinorrhea or nasal deformity. No epistaxis. Right sinus exhibits no maxillary sinus tenderness and no frontal sinus tenderness. Left sinus exhibits no maxillary sinus tenderness and no frontal sinus tenderness.   Mouth/Throat: Uvula is midline, oropharynx is clear and moist and mucous membranes are normal. No trismus in the jaw. Normal dentition. No uvula swelling. No posterior oropharyngeal erythema.   Eyes: Conjunctivae, EOM and lids are normal. Pupils are equal, round, and reactive to light. Right eye  exhibits no discharge. Left eye exhibits no discharge. No scleral icterus.   Neck: Trachea normal and phonation normal. Neck supple. No tracheal deviation present. No neck rigidity present. No spinous process tenderness present. No muscular tenderness present.   Cardiovascular: Normal rate, regular rhythm, normal heart sounds and normal pulses.   Pulmonary/Chest: Effort normal and breath sounds normal. No respiratory distress.   Abdominal: Normal appearance and bowel sounds are normal. He exhibits no distension and no mass. Soft. There is no abdominal tenderness.   Musculoskeletal:         General: No deformity.      Right knee: He exhibits decreased range of motion, swelling and effusion. He exhibits no ecchymosis, no deformity, no laceration, no erythema, no LCL laxity, normal patellar mobility, no bony tenderness, normal meniscus and no MCL laxity. Tenderness found. Medial joint line and lateral joint line tenderness noted. anterior drawer test positive, posterior drawer test positive, right knee positive medial Elodia test and right knee positive lateral Elodia test     Left knee: Normal.      Comments: Normal sensation bilaterally.  Graded 5/5 lateral knee flexion/extension, ankle dorsiflexion/plantar flexion. Right knee- E:0, F:90. Left knee-E:0, F:120.    Neurological: He is alert and oriented to person, place, and time. He has normal strength. No cranial nerve deficit or sensory deficit. He exhibits normal muscle tone. He displays no seizure activity. Coordination normal. GCS eye subscore is 4. GCS verbal subscore is 5. GCS motor subscore is 6.   Skin: Skin is warm, dry, intact, not diaphoretic and not pale. Capillary refill takes less than 2 seconds. not right kneeNo abrasion, No burn, No bruising, No erythema and No ecchymosis   Psychiatric: His speech is normal and behavior is normal. Judgment and thought content normal.   Nursing note and vitals reviewed.      Assessment:     1. Effusion of right  knee    2. Primary osteoarthritis of right knee        Plan:       Effusion of right knee  -     Ambulatory referral/consult to Orthopedics  -     ibuprofen (ADVIL,MOTRIN) 800 MG tablet; Take 1 tablet (800 mg total) by mouth 3 (three) times daily. for 10 days  Dispense: 30 tablet; Refill: 0  -     ketorolac injection 30 mg    Primary osteoarthritis of right knee  -     ibuprofen (ADVIL,MOTRIN) 800 MG tablet; Take 1 tablet (800 mg total) by mouth 3 (three) times daily. for 10 days  Dispense: 30 tablet; Refill: 0  -     ketorolac injection 30 mg      We did not have x-ray in clinic today. Take today, patient tolerated injection well.  Patient can start ibuprofen up to 3 times a day as needed for right knee pain and swelling.  Patient is to take medicine after eating a full meal.  Patient can take over-the-counter Tylenol today for additional pain relief as needed.  Orthopedic referral placed, follow up with Orthopedics.  Continue to rest, ice, use compression, and elevate as needed.     Patient Instructions   Please drink plenty of fluids.  Please get plenty of rest.  Please return here or go to the Emergency Department for any concerns or worsening of condition.  If you were prescribed a narcotic medication, do not drive or operate heavy equipment or machinery while taking these medications.  If you were not prescribed an anti-inflammatory medication, and if you do not have any history of stomach/intestinal ulcers, or kidney disease, or are not taking a blood thinner such as Coumadin, Plavix, Pradaxa, Eloquis, or Xaralta for example, it is OK to take over the counter Ibuprofen or Advil or Motrin or Aleve as directed.  Do not take these medications on an empty stomach.  Start Ibuprofen tomorrow, take after eating a full meal. Toradol Injection given in clinic today. Patient take OTC Tylenol for additional pain relief today.   Ortho referral placed.   Rest, ice, compression and elevation (above heart)  to the affected  joint or limb as needed.  Please follow up with your primary care doctor or specialist as needed.    If you  smoke, please stop smoking.

## 2025-01-26 ENCOUNTER — OFFICE VISIT (OUTPATIENT)
Dept: URGENT CARE | Facility: CLINIC | Age: 60
End: 2025-01-26
Payer: COMMERCIAL

## 2025-01-26 VITALS
BODY MASS INDEX: 29.82 KG/M2 | TEMPERATURE: 99 F | RESPIRATION RATE: 20 BRPM | DIASTOLIC BLOOD PRESSURE: 88 MMHG | HEART RATE: 78 BPM | SYSTOLIC BLOOD PRESSURE: 127 MMHG | HEIGHT: 67 IN | OXYGEN SATURATION: 96 % | WEIGHT: 190 LBS

## 2025-01-26 DIAGNOSIS — M10.9 ACUTE GOUT OF LEFT FOOT, UNSPECIFIED CAUSE: Primary | ICD-10-CM

## 2025-01-26 PROCEDURE — 99213 OFFICE O/P EST LOW 20 MIN: CPT | Mod: 25,S$GLB,,

## 2025-01-26 PROCEDURE — 96372 THER/PROPH/DIAG INJ SC/IM: CPT | Mod: S$GLB,,, | Performed by: FAMILY MEDICINE

## 2025-01-26 RX ORDER — DEXAMETHASONE SODIUM PHOSPHATE 10 MG/ML
10 INJECTION INTRAMUSCULAR; INTRAVENOUS
Status: COMPLETED | OUTPATIENT
Start: 2025-01-26 | End: 2025-01-26

## 2025-01-26 RX ORDER — KETOROLAC TROMETHAMINE 30 MG/ML
30 INJECTION, SOLUTION INTRAMUSCULAR; INTRAVENOUS
Status: COMPLETED | OUTPATIENT
Start: 2025-01-26 | End: 2025-01-26

## 2025-01-26 RX ORDER — COLCHICINE 0.6 MG/1
0.6 TABLET ORAL DAILY
Qty: 7 TABLET | Refills: 0 | Status: SHIPPED | OUTPATIENT
Start: 2025-01-26 | End: 2025-02-02

## 2025-01-26 RX ORDER — INDOMETHACIN 50 MG/1
50 CAPSULE ORAL 3 TIMES DAILY
Qty: 15 CAPSULE | Refills: 0 | Status: SHIPPED | OUTPATIENT
Start: 2025-01-26 | End: 2025-01-31

## 2025-01-26 RX ADMIN — DEXAMETHASONE SODIUM PHOSPHATE 10 MG: 10 INJECTION INTRAMUSCULAR; INTRAVENOUS at 09:01

## 2025-01-26 RX ADMIN — KETOROLAC TROMETHAMINE 30 MG: 30 INJECTION, SOLUTION INTRAMUSCULAR; INTRAVENOUS at 09:01

## 2025-01-26 NOTE — PATIENT INSTRUCTIONS
Brenda 2 de las tabletas de colchicina cuando recoja ramos receta por primera vez, luego tome 1 tableta adicional solange hora después. A partir de mañana podrá miguel ángel 1 comprimido dos veces al día de raynn medicamento orlin los próximos 2 días.     Puedes miguel ángel el antiinflamatorio 3 veces al día a partir de esta noche.  No tome ningún antiinflamatorio antes de esta noche, ya que hoy recibió solange inyección de Toradol en atención de urgencia.    Shena mucha agua y siga solange dieta para la gota.  Adjunto documentación sobre qué alimentos desencadenan ataques de gota.

## 2025-01-26 NOTE — PROGRESS NOTES
"Subjective:      Patient ID: Todd Sevilla is a 59 y.o. male.    Vitals:  height is 5' 7" (1.702 m) and weight is 86.2 kg (190 lb). His oral temperature is 98.6 °F (37 °C). His blood pressure is 127/88 and his pulse is 78. His respiration is 20 and oxygen saturation is 96%.     Chief Complaint: Foot Pain     Pt present with left foot pain and swelling X 3 days. Pt denies any known trauma or injury.  He states the pain is worse with movement or when he puts weight on his foot, the pain is localized to the base of his big toe.  He states he has some redness and swelling to the area, however no numbness or tingling and no deformities.  He states he has been taking naproxen with minimal relief.    Edema  This is a new problem. The current episode started in the past 7 days. The problem occurs constantly. The problem has been gradually worsening. Associated symptoms include arthralgias and joint swelling. Pertinent negatives include no abdominal pain, chest pain, coughing, fatigue, fever, headaches, neck pain, numbness, sore throat or weakness. The symptoms are aggravated by walking, standing and twisting. He has tried ice and NSAIDs for the symptoms. The treatment provided mild relief.       Constitution: Negative for fatigue, fever and generalized weakness.   HENT:  Negative for ear pain, sinus pain and sore throat.    Neck: Negative for neck pain.   Cardiovascular:  Negative for chest pain.   Respiratory:  Negative for cough and shortness of breath.    Gastrointestinal:  Negative for abdominal pain.   Musculoskeletal:  Positive for joint pain and joint swelling.   Neurological:  Negative for headaches and numbness.      Objective:     Physical Exam   Constitutional: He is oriented to person, place, and time. He appears well-developed. He is cooperative.  Non-toxic appearance. He does not appear ill. No distress.   HENT:   Head: Normocephalic and atraumatic.   Ears:   Right Ear: Hearing, tympanic membrane, " external ear and ear canal normal.   Left Ear: Hearing, tympanic membrane, external ear and ear canal normal.   Nose: Nose normal. No mucosal edema, rhinorrhea or nasal deformity. No epistaxis. Right sinus exhibits no maxillary sinus tenderness and no frontal sinus tenderness. Left sinus exhibits no maxillary sinus tenderness and no frontal sinus tenderness.   Mouth/Throat: Uvula is midline, oropharynx is clear and moist and mucous membranes are normal. No trismus in the jaw. Normal dentition. No uvula swelling. No oropharyngeal exudate, posterior oropharyngeal edema or posterior oropharyngeal erythema.   Eyes: Conjunctivae and lids are normal. No scleral icterus.   Neck: Trachea normal and phonation normal. Neck supple. No edema present. No erythema present. No neck rigidity present.   Cardiovascular: Normal rate, regular rhythm, normal heart sounds and normal pulses.   Pulmonary/Chest: Effort normal and breath sounds normal. No respiratory distress. He has no decreased breath sounds. He has no rhonchi.   Abdominal: Normal appearance.   Musculoskeletal: Normal range of motion.         General: No deformity. Normal range of motion.        Legs:       Left foot: Normal capillary refill. Tenderness, bony tenderness and swelling present. No crepitus.   Neurological: He is alert and oriented to person, place, and time. He exhibits normal muscle tone. Coordination normal.   Skin: Skin is warm, dry, intact, not diaphoretic and not pale.   Psychiatric: His speech is normal and behavior is normal. Judgment and thought content normal.   Nursing note and vitals reviewed.      Assessment:     1. Acute gout of left foot, unspecified cause        Plan:   Physical exam is consistent with gout to the MTP joint of the left great toe.  Toradol and dexamethasone given in clinic, we will discharge with colchicine and indomethacin for pain control at home.  Discussed gout appropriate diet with patient and patient provided with  information on gout friendly foods at discharge.  Patient declined x-ray at this time, he understands the plan of care and treatment, and we will follow up with his PCP or return to urgent care if symptoms worsen.    Acute gout of left foot, unspecified cause  -     ketorolac injection 30 mg  -     dexAMETHasone injection 10 mg  -     colchicine (COLCRYS) 0.6 mg tablet; Take 1 tablet (0.6 mg total) by mouth once daily. for 7 doses  Dispense: 7 tablet; Refill: 0  -     indomethacin (INDOCIN) 50 MG capsule; Take 1 capsule (50 mg total) by mouth 3 (three) times daily. for 5 days  Dispense: 15 capsule; Refill: 0

## 2025-07-20 ENCOUNTER — OFFICE VISIT (OUTPATIENT)
Dept: URGENT CARE | Facility: CLINIC | Age: 60
End: 2025-07-20
Payer: COMMERCIAL

## 2025-07-20 VITALS
HEIGHT: 67 IN | HEART RATE: 79 BPM | TEMPERATURE: 98 F | WEIGHT: 190.06 LBS | RESPIRATION RATE: 20 BRPM | SYSTOLIC BLOOD PRESSURE: 147 MMHG | OXYGEN SATURATION: 97 % | BODY MASS INDEX: 29.83 KG/M2 | DIASTOLIC BLOOD PRESSURE: 88 MMHG

## 2025-07-20 DIAGNOSIS — T14.8XXA MUSCLE STRAIN: Primary | ICD-10-CM

## 2025-07-20 PROCEDURE — 99213 OFFICE O/P EST LOW 20 MIN: CPT | Mod: S$GLB,,,

## 2025-07-20 NOTE — PATIENT INSTRUCTIONS
- You must understand that you have received an Urgent Care treatment only and that you may be released before all of your medical problems are known or treated.   - You, the patient, will arrange for follow up care as instructed.   - If your condition worsens or fails to improve we recommend that you receive another evaluation at the ER immediately or contact your PCP to discuss your concerns or return here.  Tylenol as needed for pain.   Please get plenty of rest  Follow up as needed.  Heating pad 3-4 times daily for 15 minutes at a time.

## 2025-07-20 NOTE — PROGRESS NOTES
"Subjective:      Patient ID: Todd Sevilla is a 59 y.o. male.    Vitals:  height is 5' 7" (1.702 m) and weight is 86.2 kg (190 lb 0.6 oz). His oral temperature is 98.4 °F (36.9 °C). His blood pressure is 147/88 (abnormal) and his pulse is 79. His respiration is 20 and oxygen saturation is 97%.     Chief Complaint: Back Pain    Patient presents today with back pain, was doing work at home yesterday in his garden lifting heavy item when he felt a pull, the pain comes when twisting his back. Declines any urinary symptoms.  Declined  at this time.    Back Pain  This is a new problem. The current episode started yesterday. The problem occurs intermittently. The problem is unchanged. The quality of the pain is described as aching. The pain does not radiate. The pain is at a severity of 7/10. The pain is mild. The pain is The same all the time. The symptoms are aggravated by bending and twisting. Stiffness is present All day. Pertinent negatives include no abdominal pain, bladder incontinence, bowel incontinence, chest pain, dysuria, fever, headaches, leg pain, numbness, paresis, paresthesias, pelvic pain, perianal numbness, tingling, weakness or weight loss. He has tried nothing for the symptoms. The treatment provided no relief.       Constitution: Negative for fever.   HENT: Negative.     Cardiovascular:  Negative for chest pain.   Eyes: Negative.    Gastrointestinal:  Negative for abdominal pain and bowel incontinence.   Genitourinary:  Negative for dysuria, frequency, urgency, urine decreased, bladder incontinence and pelvic pain.   Musculoskeletal:  Positive for pain and back pain.   Skin: Negative.    Allergic/Immunologic: Negative.    Neurological:  Negative for headaches and numbness.   Hematologic/Lymphatic: Negative.    Psychiatric/Behavioral: Negative.        Objective:     Physical Exam   Constitutional: He is oriented to person, place, and time.  Non-toxic appearance. No distress.   HENT: "   Head: Normocephalic and atraumatic.   Ears:   Right Ear: External ear normal.   Left Ear: External ear normal.   Nose: Nose normal.   Mouth/Throat: Mucous membranes are moist.   Cardiovascular: Normal rate.   Abdominal: Bowel sounds are normal. He exhibits no distension. There is no abdominal tenderness. There is no rebound, no guarding, no left CVA tenderness and no right CVA tenderness.   Musculoskeletal:      Cervical back: He exhibits no bony tenderness.      Thoracic back: He exhibits no bony tenderness.      Lumbar back: He exhibits no bony tenderness.        Back:    Neurological: He is alert and oriented to person, place, and time.   Skin: Skin is warm, dry and not diaphoretic. No bruising   Psychiatric: His behavior is normal. Mood, judgment and thought content normal.       Assessment:     1. Muscle strain        Plan:   I have reviewed the patient chart and pertinent past imaging/labs.  Precautions given. Patient verbalized understanding.     Muscle strain      Patient Instructions   - You must understand that you have received an Urgent Care treatment only and that you may be released before all of your medical problems are known or treated.   - You, the patient, will arrange for follow up care as instructed.   - If your condition worsens or fails to improve we recommend that you receive another evaluation at the ER immediately or contact your PCP to discuss your concerns or return here.  Tylenol as needed for pain.   Please get plenty of rest  Follow up as needed.  Heating pad 3-4 times daily for 15 minutes at a time.